# Patient Record
Sex: MALE | Race: WHITE | NOT HISPANIC OR LATINO | Employment: OTHER | URBAN - METROPOLITAN AREA
[De-identification: names, ages, dates, MRNs, and addresses within clinical notes are randomized per-mention and may not be internally consistent; named-entity substitution may affect disease eponyms.]

---

## 2023-12-19 ENCOUNTER — TELEPHONE (OUTPATIENT)
Dept: ORTHOPEDIC SURGERY | Facility: CLINIC | Age: 53
End: 2023-12-19

## 2023-12-19 ENCOUNTER — OFFICE VISIT (OUTPATIENT)
Dept: ORTHOPEDIC SURGERY | Facility: CLINIC | Age: 53
End: 2023-12-19
Payer: COMMERCIAL

## 2023-12-19 VITALS — HEIGHT: 72 IN | BODY MASS INDEX: 30.48 KG/M2 | WEIGHT: 225 LBS

## 2023-12-19 DIAGNOSIS — M19.039 WRIST ARTHRITIS: Primary | ICD-10-CM

## 2023-12-19 PROCEDURE — 1036F TOBACCO NON-USER: CPT | Performed by: ORTHOPAEDIC SURGERY

## 2023-12-19 PROCEDURE — 99214 OFFICE O/P EST MOD 30 MIN: CPT | Performed by: ORTHOPAEDIC SURGERY

## 2023-12-19 RX ORDER — NAPROXEN SODIUM 220 MG/1
1 TABLET, FILM COATED ORAL DAILY
COMMUNITY

## 2023-12-19 RX ORDER — METOPROLOL TARTRATE 25 MG/1
25 TABLET, FILM COATED ORAL 2 TIMES DAILY
COMMUNITY
Start: 2020-07-08

## 2023-12-19 RX ORDER — ATORVASTATIN CALCIUM 80 MG/1
1 TABLET, FILM COATED ORAL DAILY
COMMUNITY

## 2023-12-19 RX ORDER — TALC
3 POWDER (GRAM) TOPICAL
COMMUNITY

## 2023-12-19 RX ORDER — GLUCOSAMINE/CHONDR SU A SOD 750-600 MG
1 TABLET ORAL DAILY
COMMUNITY

## 2023-12-19 RX ORDER — NITROGLYCERIN 0.4 MG/1
0.4 TABLET SUBLINGUAL
COMMUNITY
Start: 2020-09-25

## 2023-12-19 RX ORDER — MULTIVITAMIN
1 TABLET ORAL
COMMUNITY

## 2023-12-19 ASSESSMENT — PAIN SCALES - GENERAL: PAINLEVEL_OUTOF10: 9

## 2023-12-19 ASSESSMENT — PAIN - FUNCTIONAL ASSESSMENT: PAIN_FUNCTIONAL_ASSESSMENT: 0-10

## 2023-12-19 ASSESSMENT — PAIN DESCRIPTION - DESCRIPTORS: DESCRIPTORS: SHARP

## 2023-12-19 NOTE — TELEPHONE ENCOUNTER
1/19/23 lt wrist 4 corner fusion and partial wrist denervation  Patient wants to reschedule his surgery date. This is not going to work. Curious if anything on 1/26/23.

## 2023-12-20 NOTE — PROGRESS NOTES
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  He has undergone 2 previous corticosteroid injections, the last in August 2023.  He had very minimal relief of the symptoms following the last corticosteroid injection.  He has remained very symptomatic with any activities.  His left wrist is severely limiting his regular function and he has had persistent stiffness as well as constant aching pain with sharp pains.  No numbness or tingling.    Past Medical History:   Diagnosis Date    Acute upper respiratory infection, unspecified 09/01/2022    URI, acute    Old myocardial infarction     History of myocardial infarction       Medication Documentation Review Audit       Reviewed by Leny Allen CMA (Medical Assistant) on 12/19/23 at 0832      Medication Order Taking? Sig Documenting Provider Last Dose Status   aspirin 81 mg chewable tablet [id number]  Chew 1 tablet (81 mg) once daily. Historical Provider, MD  Active   atorvastatin (Lipitor) 80 mg tablet [id number]  Take 1 tablet (80 mg) by mouth once daily. Historical Provider, MD  Active   glucosamine HCl 1,500 mg tablet [id number]  Take 1 tablet by mouth once daily. Historical Provider, MD  Active   melatonin 3 mg tablet [id number]  Take 1 tablet (3 mg) by mouth once daily. Historical Provider, MD  Active   metoprolol tartrate (Lopressor) 25 mg tablet [id number] Yes Take 1 tablet (25 mg) by mouth twice a day. Historical Provider, MD  Active   multivitamin tablet [id number]  Take 1 tablet by mouth once daily. Historical Provider, MD  Active   nitroglycerin (Nitrostat) 0.4 mg SL tablet [id number] Yes Place 1 tablet (0.4 mg) under the tongue. Historical Provider, MD  Active                    No Known Allergies    Social History     Socioeconomic History    Marital status:      Spouse name: Not on file    Number of children: Not on file     Years of education: Not on file    Highest education level: Not on file   Occupational History    Not on file   Tobacco Use    Smoking status: Never    Smokeless tobacco: Never   Substance and Sexual Activity    Alcohol use: Yes    Drug use: Never    Sexual activity: Defer   Other Topics Concern    Not on file   Social History Narrative    Not on file     Social Determinants of Health     Financial Resource Strain: Not on file   Food Insecurity: Not on file   Transportation Needs: Not on file   Physical Activity: Not on file   Stress: Not on file   Social Connections: Not on file   Intimate Partner Violence: Not on file   Housing Stability: Not on file       Past Surgical History:   Procedure Laterality Date    OTHER SURGICAL HISTORY  02/05/2018    Cath Stent Placement Type        Review of Systems   GENERAL: Negative for malaise, significant weight loss, fever  MUSCULOSKELETAL: see HPI  NEURO:  Negative     Physical Examination  Constitutional: Appears well-developed and well-nourished.  Head: Normocephalic and atraumatic.  Eyes: EOMI grossly  Cardiovascular: Intact distal pulses.   Respiratory: Effort normal. No respiratory distress.  Neurologic: Alert and oriented to person, place, and time.  Skin: Skin is warm and dry.  Hematologic / Lymphatic: No lymphedema, lymphangitis.  Psychiatric: normal mood and affect. Behavior is normal.   Musculoskeletal:  Left wrist: 40 degrees flexion and 35 degrees extension.  80/80 degrees pronation/supination.  Positive TRISTON.  Tenderness overlying radioscaphoid region.  No tenderness overlying first dorsal compartment or thumb CMC joint.  No tenderness about DRUJ/TFCC.  0 cm DPC.  2+ radial pulse.     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     Plan:  We once again discussed nature of the diagnosis as well as potential treatment options.  Patient is not responding to conservative treatment with  bracing and corticosteroid injection/anti-inflammatories.  We did discuss potential role of surgical intervention and 4 corner fusion versus proximal row carpectomy.  Patient does not smoke, but does occasionally use chewing tobacco.  After thoroughly reviewing associated risks and benefits patient would like to proceed with salvage procedure in the form of 4 corner fusion.  Advised that he should stop use of chewing tobacco in order to optimize potential for healing.  We did discuss permanent diminished motion, but this will hopefully be less painful.  Expected operative and postoperative course was reviewed and questions addressed.  Plan for 4 corner fusion with PIN neurectomy and likely partial radial styloidectomy.

## 2024-01-31 ENCOUNTER — ANESTHESIA EVENT (OUTPATIENT)
Dept: OPERATING ROOM | Facility: HOSPITAL | Age: 54
End: 2024-01-31
Payer: COMMERCIAL

## 2024-02-01 RX ORDER — DROPERIDOL 2.5 MG/ML
0.62 INJECTION, SOLUTION INTRAMUSCULAR; INTRAVENOUS ONCE AS NEEDED
Status: CANCELLED | OUTPATIENT
Start: 2024-02-01

## 2024-02-01 RX ORDER — SODIUM CHLORIDE, SODIUM LACTATE, POTASSIUM CHLORIDE, CALCIUM CHLORIDE 600; 310; 30; 20 MG/100ML; MG/100ML; MG/100ML; MG/100ML
100 INJECTION, SOLUTION INTRAVENOUS CONTINUOUS
Status: CANCELLED | OUTPATIENT
Start: 2024-02-01

## 2024-02-01 RX ORDER — OXYCODONE HYDROCHLORIDE 5 MG/1
5 TABLET ORAL EVERY 4 HOURS PRN
Status: CANCELLED | OUTPATIENT
Start: 2024-02-01

## 2024-02-01 RX ORDER — ONDANSETRON HYDROCHLORIDE 2 MG/ML
4 INJECTION, SOLUTION INTRAVENOUS ONCE AS NEEDED
Status: CANCELLED | OUTPATIENT
Start: 2024-02-01

## 2024-02-02 ENCOUNTER — APPOINTMENT (OUTPATIENT)
Dept: RADIOLOGY | Facility: HOSPITAL | Age: 54
End: 2024-02-02
Payer: COMMERCIAL

## 2024-02-02 ENCOUNTER — HOSPITAL ENCOUNTER (OUTPATIENT)
Facility: HOSPITAL | Age: 54
Setting detail: OUTPATIENT SURGERY
Discharge: HOME | End: 2024-02-02
Attending: ORTHOPAEDIC SURGERY | Admitting: ORTHOPAEDIC SURGERY
Payer: COMMERCIAL

## 2024-02-02 ENCOUNTER — ANESTHESIA (OUTPATIENT)
Dept: OPERATING ROOM | Facility: HOSPITAL | Age: 54
End: 2024-02-02
Payer: COMMERCIAL

## 2024-02-02 VITALS
HEART RATE: 81 BPM | DIASTOLIC BLOOD PRESSURE: 78 MMHG | BODY MASS INDEX: 31.95 KG/M2 | SYSTOLIC BLOOD PRESSURE: 129 MMHG | TEMPERATURE: 97.9 F | WEIGHT: 235.89 LBS | HEIGHT: 72 IN | OXYGEN SATURATION: 93 %

## 2024-02-02 DIAGNOSIS — M19.039 WRIST ARTHRITIS: Primary | ICD-10-CM

## 2024-02-02 PROCEDURE — 76942 ECHO GUIDE FOR BIOPSY: CPT | Performed by: ANESTHESIOLOGY

## 2024-02-02 PROCEDURE — 2500000001 HC RX 250 WO HCPCS SELF ADMINISTERED DRUGS (ALT 637 FOR MEDICARE OP): Performed by: ORTHOPAEDIC SURGERY

## 2024-02-02 PROCEDURE — 7100000009 HC PHASE TWO TIME - INITIAL BASE CHARGE: Performed by: ORTHOPAEDIC SURGERY

## 2024-02-02 PROCEDURE — 2500000004 HC RX 250 GENERAL PHARMACY W/ HCPCS (ALT 636 FOR OP/ED): Performed by: NURSE ANESTHETIST, CERTIFIED REGISTERED

## 2024-02-02 PROCEDURE — 64772 INCISION OF SPINAL NERVE: CPT | Performed by: ORTHOPAEDIC SURGERY

## 2024-02-02 PROCEDURE — 3600000008 HC OR TIME - EACH INCREMENTAL 1 MINUTE - PROCEDURE LEVEL THREE: Performed by: ORTHOPAEDIC SURGERY

## 2024-02-02 PROCEDURE — 3700000001 HC GENERAL ANESTHESIA TIME - INITIAL BASE CHARGE: Performed by: ORTHOPAEDIC SURGERY

## 2024-02-02 PROCEDURE — C1713 ANCHOR/SCREW BN/BN,TIS/BN: HCPCS | Performed by: ORTHOPAEDIC SURGERY

## 2024-02-02 PROCEDURE — A4217 STERILE WATER/SALINE, 500 ML: HCPCS | Performed by: ORTHOPAEDIC SURGERY

## 2024-02-02 PROCEDURE — 7100000010 HC PHASE TWO TIME - EACH INCREMENTAL 1 MINUTE: Performed by: ORTHOPAEDIC SURGERY

## 2024-02-02 PROCEDURE — A25825 PR FUSION/GRAFT INTERCARPAL: Performed by: NURSE ANESTHETIST, CERTIFIED REGISTERED

## 2024-02-02 PROCEDURE — 7100000002 HC RECOVERY ROOM TIME - EACH INCREMENTAL 1 MINUTE: Performed by: ORTHOPAEDIC SURGERY

## 2024-02-02 PROCEDURE — 2720000007 HC OR 272 NO HCPCS: Performed by: ORTHOPAEDIC SURGERY

## 2024-02-02 PROCEDURE — 3600000003 HC OR TIME - INITIAL BASE CHARGE - PROCEDURE LEVEL THREE: Performed by: ORTHOPAEDIC SURGERY

## 2024-02-02 PROCEDURE — 2500000004 HC RX 250 GENERAL PHARMACY W/ HCPCS (ALT 636 FOR OP/ED): Performed by: ORTHOPAEDIC SURGERY

## 2024-02-02 PROCEDURE — 25825 ARTHRD WRIST WITH AUTOGRAFT: CPT | Performed by: ORTHOPAEDIC SURGERY

## 2024-02-02 PROCEDURE — 2500000004 HC RX 250 GENERAL PHARMACY W/ HCPCS (ALT 636 FOR OP/ED): Performed by: ANESTHESIOLOGY

## 2024-02-02 PROCEDURE — 3700000002 HC GENERAL ANESTHESIA TIME - EACH INCREMENTAL 1 MINUTE: Performed by: ORTHOPAEDIC SURGERY

## 2024-02-02 PROCEDURE — 2780000003 HC OR 278 NO HCPCS: Performed by: ORTHOPAEDIC SURGERY

## 2024-02-02 PROCEDURE — 76000 FLUOROSCOPY <1 HR PHYS/QHP: CPT

## 2024-02-02 PROCEDURE — 2500000005 HC RX 250 GENERAL PHARMACY W/O HCPCS: Performed by: NURSE ANESTHETIST, CERTIFIED REGISTERED

## 2024-02-02 PROCEDURE — 7100000001 HC RECOVERY ROOM TIME - INITIAL BASE CHARGE: Performed by: ORTHOPAEDIC SURGERY

## 2024-02-02 DEVICE — IMPLANTABLE DEVICE: Type: IMPLANTABLE DEVICE | Site: WRIST | Status: FUNCTIONAL

## 2024-02-02 DEVICE — IMPLANTABLE DEVICE: Type: IMPLANTABLE DEVICE | Site: WRIST | Status: NON-FUNCTIONAL

## 2024-02-02 RX ORDER — SODIUM CHLORIDE, SODIUM LACTATE, POTASSIUM CHLORIDE, CALCIUM CHLORIDE 600; 310; 30; 20 MG/100ML; MG/100ML; MG/100ML; MG/100ML
100 INJECTION, SOLUTION INTRAVENOUS CONTINUOUS
Status: DISCONTINUED | OUTPATIENT
Start: 2024-02-02 | End: 2024-02-02 | Stop reason: HOSPADM

## 2024-02-02 RX ORDER — FENTANYL CITRATE 50 UG/ML
INJECTION, SOLUTION INTRAMUSCULAR; INTRAVENOUS AS NEEDED
Status: DISCONTINUED | OUTPATIENT
Start: 2024-02-02 | End: 2024-02-02

## 2024-02-02 RX ORDER — MIDAZOLAM HYDROCHLORIDE 1 MG/ML
INJECTION, SOLUTION INTRAMUSCULAR; INTRAVENOUS AS NEEDED
Status: DISCONTINUED | OUTPATIENT
Start: 2024-02-02 | End: 2024-02-02

## 2024-02-02 RX ORDER — PROPOFOL 10 MG/ML
INJECTION, EMULSION INTRAVENOUS AS NEEDED
Status: DISCONTINUED | OUTPATIENT
Start: 2024-02-02 | End: 2024-02-02

## 2024-02-02 RX ORDER — CHLORHEXIDINE GLUCONATE 40 MG/ML
SOLUTION TOPICAL AS NEEDED
Status: DISCONTINUED | OUTPATIENT
Start: 2024-02-02 | End: 2024-02-02 | Stop reason: HOSPADM

## 2024-02-02 RX ORDER — SODIUM CHLORIDE 0.9 G/100ML
IRRIGANT IRRIGATION AS NEEDED
Status: DISCONTINUED | OUTPATIENT
Start: 2024-02-02 | End: 2024-02-02 | Stop reason: HOSPADM

## 2024-02-02 RX ORDER — GLYCOPYRROLATE 0.2 MG/ML
INJECTION INTRAMUSCULAR; INTRAVENOUS AS NEEDED
Status: DISCONTINUED | OUTPATIENT
Start: 2024-02-02 | End: 2024-02-02

## 2024-02-02 RX ORDER — DEXAMETHASONE SODIUM PHOSPHATE 4 MG/ML
INJECTION, SOLUTION INTRA-ARTICULAR; INTRALESIONAL; INTRAMUSCULAR; INTRAVENOUS; SOFT TISSUE AS NEEDED
Status: DISCONTINUED | OUTPATIENT
Start: 2024-02-02 | End: 2024-02-02

## 2024-02-02 RX ORDER — PHENYLEPHRINE HCL IN 0.9% NACL 0.4MG/10ML
SYRINGE (ML) INTRAVENOUS AS NEEDED
Status: DISCONTINUED | OUTPATIENT
Start: 2024-02-02 | End: 2024-02-02

## 2024-02-02 RX ORDER — LIDOCAINE HYDROCHLORIDE 20 MG/ML
INJECTION, SOLUTION INFILTRATION; PERINEURAL AS NEEDED
Status: DISCONTINUED | OUTPATIENT
Start: 2024-02-02 | End: 2024-02-02

## 2024-02-02 RX ORDER — HYDROCODONE BITARTRATE AND ACETAMINOPHEN 5; 325 MG/1; MG/1
1 TABLET ORAL EVERY 6 HOURS PRN
Qty: 24 TABLET | Refills: 0 | Status: SHIPPED | OUTPATIENT
Start: 2024-02-02 | End: 2024-02-09

## 2024-02-02 RX ORDER — ONDANSETRON HYDROCHLORIDE 2 MG/ML
INJECTION, SOLUTION INTRAVENOUS AS NEEDED
Status: DISCONTINUED | OUTPATIENT
Start: 2024-02-02 | End: 2024-02-02

## 2024-02-02 RX ORDER — CEFAZOLIN SODIUM 2 G/100ML
2 INJECTION, SOLUTION INTRAVENOUS EVERY 8 HOURS
Status: DISCONTINUED | OUTPATIENT
Start: 2024-02-02 | End: 2024-02-02 | Stop reason: HOSPADM

## 2024-02-02 RX ORDER — CEFAZOLIN 1 G/1
INJECTION, POWDER, FOR SOLUTION INTRAVENOUS AS NEEDED
Status: DISCONTINUED | OUTPATIENT
Start: 2024-02-02 | End: 2024-02-02

## 2024-02-02 RX ADMIN — DEXAMETHASONE SODIUM PHOSPHATE 8 MG: 4 INJECTION INTRA-ARTICULAR; INTRALESIONAL; INTRAMUSCULAR; INTRAVENOUS; SOFT TISSUE at 14:59

## 2024-02-02 RX ADMIN — FENTANYL CITRATE 100 MCG: 50 INJECTION, SOLUTION INTRAMUSCULAR; INTRAVENOUS at 14:50

## 2024-02-02 RX ADMIN — CEFAZOLIN 2 G: 1 INJECTION, POWDER, FOR SOLUTION INTRAMUSCULAR; INTRAVENOUS at 14:44

## 2024-02-02 RX ADMIN — PROPOFOL 200 MG: 10 INJECTION, EMULSION INTRAVENOUS at 14:50

## 2024-02-02 RX ADMIN — SODIUM CHLORIDE, SODIUM LACTATE, POTASSIUM CHLORIDE, AND CALCIUM CHLORIDE: 600; 310; 30; 20 INJECTION, SOLUTION INTRAVENOUS at 15:20

## 2024-02-02 RX ADMIN — GLYCOPYRROLATE 0.2 MG: 0.2 INJECTION, SOLUTION INTRAMUSCULAR; INTRAVENOUS at 14:59

## 2024-02-02 RX ADMIN — Medication 80 MCG: at 15:43

## 2024-02-02 RX ADMIN — LIDOCAINE HYDROCHLORIDE 50 MG: 20 INJECTION, SOLUTION INFILTRATION; PERINEURAL at 14:50

## 2024-02-02 RX ADMIN — FENTANYL CITRATE 100 MCG: 50 INJECTION, SOLUTION INTRAMUSCULAR; INTRAVENOUS at 13:55

## 2024-02-02 RX ADMIN — SODIUM CHLORIDE, SODIUM LACTATE, POTASSIUM CHLORIDE, AND CALCIUM CHLORIDE: 600; 310; 30; 20 INJECTION, SOLUTION INTRAVENOUS at 14:38

## 2024-02-02 RX ADMIN — SODIUM CHLORIDE, POTASSIUM CHLORIDE, SODIUM LACTATE AND CALCIUM CHLORIDE 100 ML/HR: 600; 310; 30; 20 INJECTION, SOLUTION INTRAVENOUS at 13:34

## 2024-02-02 RX ADMIN — GLYCOPYRROLATE 0.2 MG: 0.2 INJECTION, SOLUTION INTRAMUSCULAR; INTRAVENOUS at 15:19

## 2024-02-02 RX ADMIN — ONDANSETRON 4 MG: 2 INJECTION, SOLUTION INTRAMUSCULAR; INTRAVENOUS at 16:44

## 2024-02-02 RX ADMIN — MIDAZOLAM 2 MG: 1 INJECTION INTRAMUSCULAR; INTRAVENOUS at 13:55

## 2024-02-02 RX ADMIN — Medication 80 MCG: at 15:33

## 2024-02-02 RX ADMIN — Medication 80 MCG: at 15:19

## 2024-02-02 SDOH — HEALTH STABILITY: MENTAL HEALTH: CURRENT SMOKER: 0

## 2024-02-02 ASSESSMENT — PAIN SCALES - GENERAL
PAINLEVEL_OUTOF10: 0 - NO PAIN
PAIN_LEVEL: 0

## 2024-02-02 ASSESSMENT — PAIN - FUNCTIONAL ASSESSMENT
PAIN_FUNCTIONAL_ASSESSMENT: 0-10

## 2024-02-02 NOTE — H&P
History of Present Illness:       Chief Complaint   Patient presents with    Left Wrist - Follow-up       SLAC wrist       Patient has failed conservative treatment for left SLAC wrist.  Presents today for left 4 corner fusion.     Medical History        Past Medical History:   Diagnosis Date    Acute upper respiratory infection, unspecified 09/01/2022     URI, acute    Old myocardial infarction       History of myocardial infarction            Medication Documentation Review Audit         Reviewed by Leny Allen CMA (Medical Assistant) on 12/19/23 at 0832       Medication Order Taking? Sig Documenting Provider Last Dose Status   aspirin 81 mg chewable tablet [id number]   Chew 1 tablet (81 mg) once daily. Historical Provider, MD   Active   atorvastatin (Lipitor) 80 mg tablet [id number]   Take 1 tablet (80 mg) by mouth once daily. Historical Provider, MD   Active   glucosamine HCl 1,500 mg tablet [id number]   Take 1 tablet by mouth once daily. Historical Provider, MD   Active   melatonin 3 mg tablet [id number]   Take 1 tablet (3 mg) by mouth once daily. Historical Provider, MD   Active   metoprolol tartrate (Lopressor) 25 mg tablet [id number] Yes Take 1 tablet (25 mg) by mouth twice a day. Historical Provider, MD   Active   multivitamin tablet [id number]   Take 1 tablet by mouth once daily. Historical Provider, MD   Active   nitroglycerin (Nitrostat) 0.4 mg SL tablet [id number] Yes Place 1 tablet (0.4 mg) under the tongue. Historical Provider, MD   Active                          No Known Allergies     Social History               Socioeconomic History    Marital status:        Spouse name: Not on file    Number of children: Not on file    Years of education: Not on file    Highest education level: Not on file   Occupational History    Not on file   Tobacco Use    Smoking status: Never    Smokeless tobacco: Never   Substance and Sexual Activity    Alcohol use: Yes    Drug use: Never    Sexual activity: Defer    Other Topics Concern    Not on file   Social History Narrative    Not on file      Social Determinants of Health      Financial Resource Strain: Not on file   Food Insecurity: Not on file   Transportation Needs: Not on file   Physical Activity: Not on file   Stress: Not on file   Social Connections: Not on file   Intimate Partner Violence: Not on file   Housing Stability: Not on file            Surgical History         Past Surgical History:   Procedure Laterality Date    OTHER SURGICAL HISTORY   02/05/2018     Cath Stent Placement Type            Review of Systems   GENERAL: Negative for malaise, significant weight loss, fever  MUSCULOSKELETAL: see HPI  NEURO:  Negative     Physical Examination  Constitutional: Appears well-developed and well-nourished.  Head: Normocephalic and atraumatic.  Eyes: EOMI grossly  Cardiovascular: Intact distal pulses.   Respiratory: Effort normal. No respiratory distress.  Neurologic: Alert and oriented to person, place, and time.  Skin: Skin is warm and dry.  Hematologic / Lymphatic: No lymphedema, lymphangitis.  Psychiatric: normal mood and affect. Behavior is normal.   Musculoskeletal:  Left wrist: 40 degrees flexion and 35 degrees extension.  80/80 degrees pronation/supination.  Positive TRISTON.  Tenderness overlying radioscaphoid region.  No tenderness overlying first dorsal compartment or thumb CMC joint.  No tenderness about DRUJ/TFCC.  0 cm DPC.  2+ radial pulse.     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     Plan:  We once again discussed nature of the diagnosis as well as potential treatment options.  Patient is not responding to conservative treatment with bracing and corticosteroid injection/anti-inflammatories.  We did discuss potential role of surgical intervention and 4 corner fusion versus proximal row carpectomy.  Patient does not smoke, but does occasionally use chewing tobacco.   After thoroughly reviewing associated risks and benefits patient would like to proceed with salvage procedure in the form of 4 corner fusion.  Advised that he should stop use of chewing tobacco in order to optimize potential for healing.  We did discuss permanent diminished motion, but this will hopefully be less painful.  Expected operative and postoperative course was reviewed and questions addressed.  Plan for 4 corner fusion with PIN neurectomy and likely partial radial styloidectomy.

## 2024-02-02 NOTE — ANESTHESIA PREPROCEDURE EVALUATION
Patient: Eddie Young    Procedure Information       Date/Time: 02/02/24 1415    Procedure: LEFT WRIST 4-CORNER FUSION AND PARTIAL WRIST DENERVATION (Left: Wrist)    Location: [facility] / [facility]    Surgeons: Carroll Saba MD          There were no vitals filed for this visit.    Past Surgical History:   Procedure Laterality Date    APPENDECTOMY      CORONARY ANGIOPLASTY WITH STENT PLACEMENT  2018    Drug-eluting stent was placedin the circumflex marginal branch    HERNIA REPAIR       Past Medical History:   Diagnosis Date    Acute upper respiratory infection, unspecified 09/01/2022    URI, acute    CAD (coronary artery disease), native coronary artery     Class 1 obesity with body mass index (BMI) of 30.0 to 30.9 in adult 12/19/2023    30.52 kg/m²    HLD (hyperlipidemia)     Old myocardial infarction 2018    History of myocardial infarction    Wrist arthritis      No current facility-administered medications for this encounter.  Prior to Admission medications    Medication Sig Start Date End Date Taking? Authorizing Provider   aspirin 81 mg chewable tablet Chew 1 tablet (81 mg) once daily.    Historical Provider, MD   atorvastatin (Lipitor) 80 mg tablet Take 1 tablet (80 mg) by mouth once daily.    Historical Provider, MD   glucosamine HCl 1,500 mg tablet Take 1 tablet by mouth once daily.    Historical Provider, MD   melatonin 3 mg tablet Take 1 tablet (3 mg) by mouth once daily.    Historical Provider, MD   metoprolol tartrate (Lopressor) 25 mg tablet Take 1 tablet (25 mg) by mouth twice a day. 7/8/20   Historical Provider, MD   multivitamin tablet Take 1 tablet by mouth once daily.    Historical Provider, MD   nitroglycerin (Nitrostat) 0.4 mg SL tablet Place 1 tablet (0.4 mg) under the tongue. 9/25/20   Historical Provider, MD     No Known Allergies  Social History     Tobacco Use    Smoking status: Never    Smokeless tobacco: Never   Substance Use Topics    Alcohol use: Yes         Chemistry    Lab  "Results   Component Value Date/Time     08/08/2022 1003    K 4.6 08/08/2022 1003     08/08/2022 1003    CO2 27 08/08/2022 1003    BUN 17 08/08/2022 1003    CREATININE 0.90 08/08/2022 1003    Lab Results   Component Value Date/Time    CALCIUM 9.2 08/08/2022 1003    ALKPHOS 73 08/08/2022 1003    AST 27 08/08/2022 1003    ALT 40 08/08/2022 1003    BILITOT 0.8 08/08/2022 1003          Lab Results   Component Value Date/Time    WBC 6.4 08/08/2022 1003    HGB 16.1 08/08/2022 1003    HCT 46.4 08/08/2022 1003     08/08/2022 1003     No results found for: \"PROTIME\", \"PTT\", \"INR\"  No results found for this or any previous visit (from the past 4464 hour(s)).  No results found for this or any previous visit from the past 1095 days.    Relevant Problems   Other   (+) Wrist arthritis       Clinical information reviewed:       Med Hx             NPO Detail:  No data recorded     Physical Exam    Airway  Mallampati: II     Cardiovascular - normal exam     Dental    Pulmonary    Abdominal            Anesthesia Plan    History of general anesthesia?: yes  History of complications of general anesthesia?: no    ASA 2     general and regional     The patient is not a current smoker.  Patient was not previously instructed to abstain from smoking on day of procedure.  Patient did not smoke on day of procedure.  Education provided regarding risk of obstructive sleep apnea.  intravenous induction   Anesthetic plan and risks discussed with patient.    Plan discussed with CRNA.      "

## 2024-02-02 NOTE — DISCHARGE INSTRUCTIONS
Dr. Saba Post-Operative Instructions  Hand/Wrist/Elbow    Activity:  Rest on the day of surgery.  Gradually resume your regular diet, beginning with clear liquids and progressing as you feel ready.  No driving if you had anesthesia.    Anesthesia:  You may feel dizzy, sleepy or lightheaded for up to 24 hours after surgery.  If you had general anesthesia you may have a sore throat for 1-2 days.  If you had a nerve block wear a sling until nerve function returns for your safety.  Nerve block may last 18-36 hours.    Post-Operative Medications:  You may resume your regular medications (including blood thinners if you stopped them).  You have been given a prescription for pain medication as needed.  You may also take over-the-counter anti-inflammatories and/or Tylenol for pain relief.  If you are taking the prescribed pain medication you must limit additional Tylenol (acetaminophen) intake to avoid overdose.    Dressings:  Keep your splint clean and dry.  You may cover with a plastic bag or cast cover and seal bag to your skin above the bandage for showering.  If your dressing becomes wet or significantly bloodstained call the office at [phone].    Post-Operative Care:  Keep the surgical site elevated above the level of your heart to limit swelling.  If your fingers are not included in the dressing you are encouraged to move your fingers regularly (full fist and full extension).  Regularly ice the surgical site.  You may also apply ice pack above the level of the dressing and this will cool the blood as it travels towards the surgical site.    Call Surgeon/Office at any time for:           Office Number: [phone]  Excessive bleeding  Loss of feeling (The local numbing medicine from surgery typically lasts 8-12 hours.  Nerve blocks can last 18-36 hours.)  Tight dressing: Make sure that you are elevating the operative site appropriately.  If no relief then call the office.                                                                                                         Circulation issues:  If fingers change to white or blue  Concerns/Problems with your surgery

## 2024-02-02 NOTE — OP NOTE
Pre-Operative Diagnosis: Left SLAC wrist  Post-Operative Diagnosis: same  Procedure: Left wrist 4 corner fusion with partial wrist denervation  Surgeon: Wandy  Assistant: Shirlene  Anesthesia: General with regional block  Complications: none  Estimated blood loss: 25 mL  Specimen: None  Implants:  Implant Name Type Inv. Item Serial No.  Lot No. LRB No. Used Action   HEADLESS COMPRESSION SCREW 3.5mm X 26mm    [employer]  Left 1 Implanted   SCREW, HEADLESS COMPRESSION, 2.5 X 22MM, TI - [id number] Screw SCREW, HEADLESS COMPRESSION, 2.5 X 22MM, TI  [employer]  Left 1 Implanted     Findings: See below  Disposition: Good/PACU      Indications: Patient with left SLAC wrist that is failed conservative treatment.  We discussed risks and benefits of various treatment options including various nonoperative and operative treatment modalities.  After thoroughly reviewing patient would like to proceed with 4 corner fusion and plan for concurrent partial wrist denervation.  Expected operative and postoperative courses reviewed and questions addressed.    Operative course: Patient was greeted in the preoperative holding area and the operative site was marked with indelible marker.  Regional block was performed by the anesthesia team.  Patient was brought back to the operating room suite where left upper extremity was prepped and draped in standard sterile fashion timeout procedure was performed as per standard protocol.  Esmarch was used to exsanguinate left upper extremity and upper arm tourniquet was inflated.  Longitudinal incision was made in line with Beryl's tubercle, extending distally.  Gentle spreading was carried down through the subcutaneous tissues.  The superficial veins were retracted radially and ulnarly and the EPL tendon was identified.  The EPL extensor retinaculum was partially released so that the EPL could be temporarily transposed and retracted radially.  The fourth dorsal  compartment was then partially elevated to allow for visualization of the PIN nerve on the floor the fourth dorsal compartment.  A 1 cm portion of the PIN nerve was then sharply excised.  The dorsal wrist capsule was then opened, leaving a proximal flap.  This allowed for visualization of the carpal bones.  The scaphoid was found to have complete denuding of cartilage along the proximal aspect with severe arthritis about the radial styloid.  The scaphoid was then excised with assistance of a rongeur.  The internal bone of the scaphoid was harvested for use as autograft.  Joint preparation was then undertaken for the distal portion of the lunate, proximal portion of the capitate as well as the surfaces of the hamate and triquetrum.  Fluoroscopic imaging was then utilized and the assistance of reduction of the dorsal lunate tilt as well as the radial shift of the distal carpal row.  After reasonable alignment was achieved K wires were positioned appropriately with use of fluoroscopic imaging to prepare for insertion of headless compression screws.  The screw from the lunate into the capitate was placed in a antegrade fashion.  This was performed after some of the bone graft was placed about the volar fusion site.  Excellent compression was achieved.  A second compression screw was placed from the triquetrum into the capitate.  Additional compression was appreciated.  The remaining bone graft was packed dorsally.  Tourniquet was released and small venous bleeders were cauterized with bipolar electrocautery.  Wound was irrigated and the dorsal capsule was repaired with 2-0 Vicryl.  The retinaculum overlying the EPL tendon was also repaired with 2-0 Vicryl and tendon was confirmed to have good gliding after repair of this retinaculum.    Skin was reapproximated with 4-0 Monocryl in a buried interrupted fashion followed by 4-0 V-Loc suture.  Exofin mesh dressings were applied and patient was transitioned into a volar  splint.    Patient was awoken from anesthesia uneventfully and taken the recovery for further care.  He will follow-up in 2 weeks with new radiographs out of splint.  Tentative plan for transition to brace versus cast until radiographic healing.  He will remain strictly nonweightbearing to his left hand/wrist.

## 2024-02-02 NOTE — ANESTHESIA PROCEDURE NOTES
Peripheral Block    Patient location during procedure: pre-op  Reason for block: at surgeon's request and post-op pain management  Staffing  Performed: attending   Authorized by: Juan Manuel Cardenas MD    Performed by: Juan Manuel Cardenas MD  Preanesthetic Checklist  Completed: patient identified, IV checked, site marked, risks and benefits discussed, surgical consent, monitors and equipment checked, pre-op evaluation and timeout performed   Timeout performed at:   Peripheral Block  Patient position: laying flat  Prep: ChloraPrep and site prepped and draped  Patient monitoring: heart rate and continuous pulse ox  Block type: interscalene, infraclavicular and supraclavicular  Laterality: left  Injection technique: single-shot  Guidance: ultrasound guided  Needle  Needle type: short-bevel   Needle gauge: 22 G  Needle length: 5 cm  Needle localization: anatomical landmarks, ultrasound guidance and nerve stimulator  Assessment  Injection assessment: negative aspiration for heme, no paresthesia on injection, incremental injection and local visualized surrounding nerve on ultrasound  Paresthesia pain: none  Heart rate change: no  Slow fractionated injection: yes  Additional Notes  30mL 0.5% marcaine 1:200k epi 5mg decadron

## 2024-02-02 NOTE — ANESTHESIA POSTPROCEDURE EVALUATION
Patient: Eddie Young    Procedure Summary       Date: 02/02/24 Room / Location: [facility] / [facility]    Anesthesia Start: 1438 Anesthesia Stop: 1758    Procedure: LEFT WRIST 4-CORNER FUSION AND PARTIAL WRIST DENERVATION (Left: Wrist) Diagnosis:       Wrist arthritis      (Wrist arthritis [M19.039])    Surgeons: Carroll Saba MD Responsible Provider: RAJEEV Bellamy    Anesthesia Type: general, regional ASA Status: 2            Anesthesia Type: general, regional    Vitals Value Taken Time   /89 02/02/24 1754   Temp 36.6 °C (97.9 °F) 02/02/24 1754   Pulse 89 02/02/24 1805   Resp 16 02/02/24 1808   SpO2 94 % 02/02/24 1805   Vitals shown include unvalidated device data.    Anesthesia Post Evaluation    Patient location during evaluation: PACU  Patient participation: complete - patient participated  Level of consciousness: awake and alert  Pain score: 0  Pain management: adequate  Multimodal analgesia pain management approach  Airway patency: patent  Cardiovascular status: acceptable  Respiratory status: acceptable  Hydration status: acceptable  Postoperative Nausea and Vomiting: none        No notable events documented.

## 2024-02-02 NOTE — ANESTHESIA PROCEDURE NOTES
Airway  Date/Time: 2/2/2024 2:50 PM  Urgency: elective      Staffing  Performed: CRNA   Authorized by: RAJEEV Bellamy    Performed by: RAJEEV Olivarez  Patient location during procedure: OR    Indications and Patient Condition  Indications for airway management: anesthesia  Spontaneous Ventilation: absent  Sedation level: deep  Preoxygenated: yes  Patient position: sniffing  Mask difficulty assessment: 0 - not attempted    Final Airway Details  Final airway type: supraglottic airway      Successful airway: Supraglottic airway: IGEL.  Size 5     Number of attempts at approach: 1

## 2024-02-13 ENCOUNTER — HOSPITAL ENCOUNTER (OUTPATIENT)
Dept: RADIOLOGY | Facility: CLINIC | Age: 54
Discharge: HOME | End: 2024-02-13
Payer: COMMERCIAL

## 2024-02-13 ENCOUNTER — OFFICE VISIT (OUTPATIENT)
Dept: ORTHOPEDIC SURGERY | Facility: CLINIC | Age: 54
End: 2024-02-13
Payer: COMMERCIAL

## 2024-02-13 DIAGNOSIS — M19.039 WRIST ARTHRITIS: ICD-10-CM

## 2024-02-13 PROCEDURE — 1036F TOBACCO NON-USER: CPT | Performed by: ORTHOPAEDIC SURGERY

## 2024-02-13 PROCEDURE — L3908 WHO COCK-UP NONMOLDE PRE OTS: HCPCS | Performed by: ORTHOPAEDIC SURGERY

## 2024-02-13 PROCEDURE — 73110 X-RAY EXAM OF WRIST: CPT | Mod: LEFT SIDE | Performed by: RADIOLOGY

## 2024-02-13 PROCEDURE — 73110 X-RAY EXAM OF WRIST: CPT | Mod: LT

## 2024-02-13 PROCEDURE — 99024 POSTOP FOLLOW-UP VISIT: CPT | Performed by: ORTHOPAEDIC SURGERY

## 2024-02-13 ASSESSMENT — PAIN - FUNCTIONAL ASSESSMENT: PAIN_FUNCTIONAL_ASSESSMENT: NO/DENIES PAIN

## 2024-02-13 NOTE — PROGRESS NOTES
History of Present Illness  Chief Complaint   Patient presents with    Left Wrist - Post-op     2/2/24 left wrist 4 corner fusion     No numbness or tingling.  Pain controlled.     Examination  left wrist  Incisions are well healing.  No signs of infection.  Wrist motion not assessed.  0 cm DPC  EPL and FPL intact.  Sensation grossly intact to light touch throughout hand, specifically including radial and ulnar sensory  Capillary refill less than 2 seconds to all digits.     Left wrist radiographs ordered and available for review demonstrate maintained alignment status post 4 corner fusion.  Hardware in appropriate positioning.    Assessment:  Patient status post left wrist 4 corner fusion  Patient transitioned to removable brace that he will wear full-time.  Remain less than 1 to 2 pounds weightbearing.  Follow-up in 4 weeks with new wrist radiographs.  If there is evidence of fusion and healing we will likely refer to therapy for gradual mobilization at that time.

## 2024-03-12 ENCOUNTER — HOSPITAL ENCOUNTER (OUTPATIENT)
Dept: RADIOLOGY | Facility: CLINIC | Age: 54
Discharge: HOME | End: 2024-03-12
Payer: COMMERCIAL

## 2024-03-12 ENCOUNTER — OFFICE VISIT (OUTPATIENT)
Dept: ORTHOPEDIC SURGERY | Facility: CLINIC | Age: 54
End: 2024-03-12
Payer: COMMERCIAL

## 2024-03-12 DIAGNOSIS — M19.039 WRIST ARTHRITIS: ICD-10-CM

## 2024-03-12 PROCEDURE — 73110 X-RAY EXAM OF WRIST: CPT | Mod: LEFT SIDE | Performed by: STUDENT IN AN ORGANIZED HEALTH CARE EDUCATION/TRAINING PROGRAM

## 2024-03-12 PROCEDURE — 1036F TOBACCO NON-USER: CPT | Performed by: ORTHOPAEDIC SURGERY

## 2024-03-12 PROCEDURE — 99024 POSTOP FOLLOW-UP VISIT: CPT | Performed by: ORTHOPAEDIC SURGERY

## 2024-03-12 PROCEDURE — 73110 X-RAY EXAM OF WRIST: CPT | Mod: LT

## 2024-03-12 ASSESSMENT — PAIN - FUNCTIONAL ASSESSMENT: PAIN_FUNCTIONAL_ASSESSMENT: NO/DENIES PAIN

## 2024-03-12 NOTE — PROGRESS NOTES
History of Present Illness  Chief Complaint   Patient presents with    Left Wrist - Post-op     2/2/24 left wrist four corner fusion     No numbness or tingling.  Pain continues to improve     Examination  left wrist  Incisions are well healing.  No signs of infection.  30 degrees wrist flexion and 10 degrees extension immediately brace  0 cm DPC  EPL and FPL intact.  Sensation grossly intact to light touch throughout hand, specifically including radial and ulnar sensory.  Patient does note some slightly diminished sensation in small area immediately distal to incision  Capillary refill less than 2 seconds to all digits.  Minimal tenderness overlying fusion site     Left wrist radiographs ordered and available for review demonstrate maintained alignment status post 4 corner fusion.  Hardware in appropriate positioning.  Some consolidation fusion site    Assessment:  Patient status post left wrist 4 corner fusion  Patient may gradually wean from his wrist brace over the next few weeks.  No weightbearing 1 to 2 pounds referral to therapy has been placed for assistance with active mobilization.  He will follow-up in 4 weeks with repeat right wrist radiographs and likely advance activities further at that time.

## 2024-03-25 ENCOUNTER — EVALUATION (OUTPATIENT)
Dept: OCCUPATIONAL THERAPY | Facility: CLINIC | Age: 54
End: 2024-03-25
Payer: COMMERCIAL

## 2024-03-25 DIAGNOSIS — M19.039 WRIST ARTHRITIS: ICD-10-CM

## 2024-03-25 PROCEDURE — 97165 OT EVAL LOW COMPLEX 30 MIN: CPT | Mod: GO

## 2024-03-25 ASSESSMENT — ENCOUNTER SYMPTOMS
DEPRESSION: 0
LOSS OF SENSATION IN FEET: 0
OCCASIONAL FEELINGS OF UNSTEADINESS: 0

## 2024-03-25 NOTE — PROGRESS NOTES
"  Occupational Therapy Orthopedic Evaluation    Patient Name: Eddie Young  MRN: [id number]  Today's Date: 3/25/2024     Insurance:  Visit number: 1  Insurance Type: Ambetter   Authorization info: Requesting auth    General:  Reason for visit: Left wrist arthritis M19.039  Referred by: Dr. STACIE Saba MD     Current Problem  1. Wrist arthritis  Referral to Occupational Therapy        Precautions:  1-2 lbs lifting restriction, per physician note    Medical History Form: Reviewed (scanned into chart)  Diagnoses pertinent to therapy: See AEMR     SUBJECTIVE:   Patient is a 53 yr old male referred to Occupational Therapy for the diagnosis of Left wrist arthritis s/p 4 corner fusion surgery. Pt presents with soft prefab wrist brace he purchased, and wears physician issued split at night.     JORGE: Pt on side by side 4 elder and hip stump, injured hand  Date of onset: Per pt, approx 3 yrs ago  Date of surgery: February 2, 2024   Chief complaints/concerns from patient/family member: Tightness in fingers with fisting, Pain and difficulty holding onto household objects/IADSL. Increased wrist swelling and pain.     Hand Dominance: Right    Pain:   Location: Left dorsal wrist   At rest :  0/10            Fxal use/movement:   6/10      Worst:  6/10  Description/Type: \"Pressure\" Ache  Aggravating Factors: Fine motor with digits, wrist AROM  Relieving Factors: CP use    Relevant Information (PMH & Previous Tests/Imaging):   Previous Interventions/Treatments: None    Prior Level of Function (PLOF)  Previous ADL/IADL Status:  Independent   Work/School: Retired    Leisure/Hobbies: Fishing, hunting     Patients Living Environment: Spouse     Primary Language: English    Pt goals for therapy:   Decrease pain, increase use for daily activities.     OBJECTIVE:     ROM:  Elbow/Forearm AROM (Degrees of motion)    R WNL/WFL throughout  L   Extension  WNL   Flexion  WNL   Pronation  WFL   Supination  WFL      Wrist AROM  (Degrees of " motion)    R L   Extension  33   Flexion  42   Radial Deviation  5   Ulnar Deviation  13      Composite fist/digit AROM: Tight fist with stiffness reports in digits   Thumb AROM: WNL     Hand Strength Measures: LBS   R L   Dynamometer  NE NE due to surgical healing/protocol   Lateral Pinch NE    3jaw Pinch  Tip Pinch NE       Physical Observation:   Edema: Mild edema observed dorsal wrist   Paresthesias: Over incision. Light touch grossly intact  Scar/Incision: Dorsal wrist incision thin, gliding well, no hypersensitivity reports  Coordination: WFL    Quick Dash outcome measurement: 47.73%  %    Red Flags: Do you have any of the following? No  Fever/chills, unexplained weight changes, dizziness/fainting, unexplained change in bowel or bladder functions, unexplained malaise or muscle weakness, night pain/sweats, numbness or tingling    Treatment:   OT evaluation completed and HEP issued.   Patient fitted and issued a tubigrip E sleeve x 2 for edema reduction/support with splint weaning. Wear, care and precautions instructed to patient. He reports good fit and understanding of precautions.    Patient education on rationale, benefits and timing of MH, warm soaks and CP use to decrease pain/symptoms.  Gentle AROM of F/A, wrist TGE's, thumb exercises.  Patient verbalizes and demonstrates good understanding,technique and precautions with above.  Written and illustrated handouts issued to patient.        ASSESSMENT:   Patient is a 53 y.o. male  with the diagnosis of Left wrist arthritis s/p 4 corner fusion resulting in limited ability and participation in ADLs, IADLS and leisure activities.. Pt demonstrating increase pain and swelling of wrist, decreased fxal mobility and strength. Pt would benefit from skilled Occupational Therapy to address the above deficits in order to return to functional activities as able with increased independence.     PLAN:  Goals:    Active       OT Goals       Pt to improve left wrist  active flexion/extension by > 10 degrees or better for increase ease with ADLS.       Start:  03/25/24    Expected End:  05/24/24            Patient to increase  strength to WFL of Left hand for ease with lifting and carrying tasks and IADLS.        Start:  03/25/24    Expected End:  05/24/24            Patient to improve m.strength of left forearm/wrist to 4/5 or better for return to leisure activities (ie. fishing/hunting.).       Start:  03/25/24    Expected End:  05/24/24               OT Problem       PATIENT WILL be independent with pain reduction techs OF 2/10 left wrist DEMONSTRATING A REDUCTION OF OVERALL PAIN       Start:  03/25/24    Expected End:  05/24/24            PATIENT WILL DEMONSTRATE INDEPENDENCE IN HOME PROGRAM FOR SUPPORT OF PROGRESSION       Start:  03/25/24    Expected End:  05/24/24               Planned Interventions include: therapeutic exercise, therapeutic activity, self-care home management, manual therapy, neuromuscular education , electric stimulation, fluidotherapy, ultrasound, Home exercise program, orthosis fabrication, wound care/scar management.     Frequency: 1-2 x week  Duration: 4-6   weeks    Rehab Potential: Good  Plan of care was developed with input and agreement by the patient.       Mita Barnes MS, OTR/L [id number]

## 2024-04-09 ENCOUNTER — OFFICE VISIT (OUTPATIENT)
Dept: ORTHOPEDIC SURGERY | Facility: CLINIC | Age: 54
End: 2024-04-09
Payer: COMMERCIAL

## 2024-04-09 ENCOUNTER — HOSPITAL ENCOUNTER (OUTPATIENT)
Dept: RADIOLOGY | Facility: CLINIC | Age: 54
Discharge: HOME | End: 2024-04-09
Payer: COMMERCIAL

## 2024-04-09 DIAGNOSIS — M19.039 WRIST ARTHRITIS: ICD-10-CM

## 2024-04-09 PROCEDURE — 99024 POSTOP FOLLOW-UP VISIT: CPT | Performed by: ORTHOPAEDIC SURGERY

## 2024-04-09 PROCEDURE — 73110 X-RAY EXAM OF WRIST: CPT | Mod: LT

## 2024-04-09 PROCEDURE — 73110 X-RAY EXAM OF WRIST: CPT | Mod: LEFT SIDE | Performed by: STUDENT IN AN ORGANIZED HEALTH CARE EDUCATION/TRAINING PROGRAM

## 2024-04-09 PROCEDURE — 1036F TOBACCO NON-USER: CPT | Performed by: ORTHOPAEDIC SURGERY

## 2024-04-09 ASSESSMENT — PAIN - FUNCTIONAL ASSESSMENT: PAIN_FUNCTIONAL_ASSESSMENT: NO/DENIES PAIN

## 2024-04-09 NOTE — PROGRESS NOTES
History of Present Illness  Chief Complaint   Patient presents with    Left Wrist - Post-op     2/2/24 left wrist four corner fusion      Pain has continued to improve.  Some continued wrist stiffness.      Examination  left wrist  Incisions are well healed.  No signs of infection.  40 degrees wrist flexion and 25 degrees extension  0 cm DPC  EPL and FPL intact.  Sensation intact distally.  Capillary refill less than 2 seconds to all digits.  No tenderness overlying fusion site     Left wrist radiographs ordered and available for review demonstrate maintained alignment status post 4 corner fusion.  Evidence of further healing, particularly about the lunocapitate fusion site    Assessment:  Patient status post left wrist 4 corner fusion  Continue gradual progression of activities.  May begin with strengthening.  Recommend avoiding contact activities for another 4 to 6 weeks.  He will follow-up with me in 6 weeks if any persistent issues or concerns.  Questions addressed.

## 2024-04-18 ENCOUNTER — TREATMENT (OUTPATIENT)
Dept: OCCUPATIONAL THERAPY | Facility: CLINIC | Age: 54
End: 2024-04-18
Payer: COMMERCIAL

## 2024-04-18 DIAGNOSIS — M19.039 WRIST ARTHRITIS: ICD-10-CM

## 2024-04-18 PROCEDURE — 97110 THERAPEUTIC EXERCISES: CPT | Mod: GO

## 2024-04-18 PROCEDURE — 97140 MANUAL THERAPY 1/> REGIONS: CPT | Mod: GO

## 2024-04-18 PROCEDURE — 97022 WHIRLPOOL THERAPY: CPT | Mod: GO

## 2024-04-18 NOTE — PROGRESS NOTES
"Occupational Therapy   Occupational Therapy Treatment    Patient Name: Eddie Young  MRN: [id number]  Today's Date: 4/18/2024     Insurance:  Visit number: 2 (1 of 6)  Insurance Type: Ambetter   Authorization info: Refer to insurance section in chart    Current Problem  1. Wrist arthritis  Follow Up In Occupational Therapy        Precautions     SUBJECTIVE:   \"The doctor says it is healing well.\" Pt reports of continued soreness and stiffness in left wrist.    Pain:   No significant pain reports  Location:   Description:     Performing HEP?: Yes    OBJECTIVE:   HAND STRENGTH (Lbs) Setting III:    R L   Dynamometer  100 37   Lateral Pinch 26 18   3jaw Pinch  Tip Pinch 24  17 16.5  15.5      AROM Left UE: wrist flexion 52 degrees from 42    Wrist extension 37 from 33    RD 15, UD 20  Treatment:    Modalities: 10  min  Fluidotherapy treatment left  forearm, wrist and hand with AROM x 10 min      Therapeutic Exercise: 15   min  Objective measurements taken. See above for details  UPGRADED HEP: Patient instructed on and completed with use of handout as follows:  Self PROM wrist exten/flexion, Prayer wrist extension stretch  Hand helper 20# x 20 (HEP)  Issued green therapy ball :  strength x 10, lateral and 3pt pinch strength x 10 each.     Manual Therapy: 20   min  Scar massage over dorsal wrist incision  Wrist distractions, PROM all wrist planes    Therapeutic Activity:     min      Neuromuscular Re-education:  min    Orthosis:   min      Wound Care:     min      Self Care/ADL   min      Other Treatment:   min   Review of MH benefits at wrist outside of clinic    ASSESSMENT:  Slight increases in wrist AROM since IE. Patient verbalizes and demonstrates good understanding and exercise technique of progressed HEP.    PLAN:   Continue with POC. 1 x week for ROM, strength and HEP progression.     Mita Barnes MS, OTR/L [id number]         "

## 2024-04-25 ENCOUNTER — TREATMENT (OUTPATIENT)
Dept: OCCUPATIONAL THERAPY | Facility: CLINIC | Age: 54
End: 2024-04-25
Payer: COMMERCIAL

## 2024-04-25 DIAGNOSIS — M19.039 WRIST ARTHRITIS: Primary | ICD-10-CM

## 2024-04-25 PROCEDURE — 97140 MANUAL THERAPY 1/> REGIONS: CPT | Mod: GO

## 2024-04-25 PROCEDURE — 97110 THERAPEUTIC EXERCISES: CPT | Mod: GO

## 2024-04-25 PROCEDURE — 97022 WHIRLPOOL THERAPY: CPT | Mod: GO

## 2024-04-25 NOTE — PROGRESS NOTES
"Occupational Therapy   Occupational Therapy Treatment    Patient Name: Eddie Young  MRN: [id number]  Today's Date: 4/25/2024     Insurance:  Visit number: 3 (2 of 6)  Insurance Type: Ambetter   Authorization info: Refer to insurance section in chart    Current Problem  1. Wrist arthritis          Precautions     SUBJECTIVE:   \"Little tender today.\" Pt reports he purchased a hand helper  tool online for home use.     Pain:           Left wrist 5/10   Description: tender,sore    Performing HEP?: Yes    OBJECTIVE:   HAND STRENGTH (Lbs) Setting III:    R L   Dynamometer  100 43 from 37   Lateral Pinch     3jaw Pinch  Tip Pinch          Treatment:    Modalities: 15  min  Fluidotherapy treatment left  forearm, wrist and hand with AROM x 15 min      Therapeutic Exercise: 15   min  Strength measurement taken  UPGRADED HEP: Pt instruction and practice of weight assisted stretches for wrist flexion/extension using 2#-3# wts.  Patient completed 2 each direction, x 2 mins each    Manual Therapy: 15  min  Scar massage over dorsal wrist incision  Wrist distractions, PROM all wrist planes    Therapeutic Activity:     min      Neuromuscular Re-education:  min    Orthosis:   min      Wound Care:     min      Self Care/ADL   min      Other Treatment:   min   Review of CP use for pain relief     ASSESSMENT:  Slight increase in pain levels this date. Increase of  strength by 6#. Patient verbalizes and demonstrates good understanding and technique of upgraded HEP.     PLAN:   Continue with POC. 1 x week for ROM, strength and HEP progression.     Mita Barnes MS, OTR/L [id number]             "

## 2024-05-02 ENCOUNTER — TREATMENT (OUTPATIENT)
Dept: OCCUPATIONAL THERAPY | Facility: CLINIC | Age: 54
End: 2024-05-02
Payer: COMMERCIAL

## 2024-05-02 DIAGNOSIS — M19.039 WRIST ARTHRITIS: Primary | ICD-10-CM

## 2024-05-02 PROBLEM — R06.83 SNORING: Status: ACTIVE | Noted: 2024-05-02

## 2024-05-02 PROBLEM — K58.9 IBS (IRRITABLE BOWEL SYNDROME): Status: ACTIVE | Noted: 2024-05-02

## 2024-05-02 PROBLEM — M72.2 PLANTAR FASCIITIS: Status: ACTIVE | Noted: 2019-01-11

## 2024-05-02 PROBLEM — G47.19 EXCESSIVE DAYTIME SLEEPINESS: Status: ACTIVE | Noted: 2024-05-02

## 2024-05-02 PROBLEM — M65.4 RADIAL STYLOID TENOSYNOVITIS: Status: ACTIVE | Noted: 2024-05-02

## 2024-05-02 PROBLEM — L57.0 ACTINIC KERATOSIS OF LEFT CHEEK: Status: ACTIVE | Noted: 2023-02-20

## 2024-05-02 PROBLEM — M65.4 TENOSYNOVITIS, DE QUERVAIN: Status: ACTIVE | Noted: 2024-05-02

## 2024-05-02 PROBLEM — I21.4 NSTEMI (NON-ST ELEVATION MYOCARDIAL INFARCTION) (MULTI): Status: ACTIVE | Noted: 2018-02-08

## 2024-05-02 PROBLEM — M25.532 CHRONIC WRIST PAIN, LEFT: Status: ACTIVE | Noted: 2024-05-02

## 2024-05-02 PROBLEM — S69.92XA LEFT WRIST INJURY: Status: ACTIVE | Noted: 2024-05-02

## 2024-05-02 PROBLEM — G89.29 CHRONIC WRIST PAIN, LEFT: Status: ACTIVE | Noted: 2024-05-02

## 2024-05-02 PROBLEM — F10.10 ALCOHOL ABUSE: Status: ACTIVE | Noted: 2018-01-27

## 2024-05-02 PROBLEM — R19.5 POSITIVE COLORECTAL CANCER SCREENING USING COLOGUARD TEST: Status: ACTIVE | Noted: 2024-05-02

## 2024-05-02 PROBLEM — D48.5 NEOPLASM OF UNCERTAIN BEHAVIOR OF SKIN OF FACE: Status: ACTIVE | Noted: 2024-05-02

## 2024-05-02 PROBLEM — M77.9 TENDINITIS: Status: ACTIVE | Noted: 2019-01-11

## 2024-05-02 PROBLEM — E66.9 OBESITY, CLASS I, BMI 30-34.9: Status: ACTIVE | Noted: 2018-06-21

## 2024-05-02 PROBLEM — M54.50 LOW BACK PAIN: Status: ACTIVE | Noted: 2024-05-02

## 2024-05-02 PROBLEM — L81.4 OTHER MELANIN HYPERPIGMENTATION: Status: ACTIVE | Noted: 2023-02-20

## 2024-05-02 PROBLEM — I25.10 ARTERIOSCLEROSIS OF CORONARY ARTERY: Status: ACTIVE | Noted: 2021-04-02

## 2024-05-02 PROBLEM — D22.30 ATYPICAL NEVUS OF FACE: Status: ACTIVE | Noted: 2024-05-02

## 2024-05-02 PROBLEM — J10.1 INFLUENZA A: Status: ACTIVE | Noted: 2024-05-02

## 2024-05-02 PROBLEM — E66.811 OBESITY, CLASS I, BMI 30-34.9: Status: ACTIVE | Noted: 2018-06-21

## 2024-05-02 PROBLEM — L91.8 SKIN TAG: Status: ACTIVE | Noted: 2024-05-02

## 2024-05-02 PROBLEM — L20.9 ATOPIC DERMATITIS: Status: ACTIVE | Noted: 2024-05-02

## 2024-05-02 PROBLEM — R50.9 FEVER: Status: ACTIVE | Noted: 2024-05-02

## 2024-05-02 PROBLEM — I25.2 HISTORY OF MYOCARDIAL INFARCTION: Status: ACTIVE | Noted: 2022-11-15

## 2024-05-02 PROBLEM — E78.2 MIXED HYPERLIPIDEMIA: Status: ACTIVE | Noted: 2023-10-23

## 2024-05-02 PROBLEM — R21 LOCALIZED RASH: Status: ACTIVE | Noted: 2024-05-02

## 2024-05-02 PROBLEM — C44.329 SQUAMOUS CELL CARCINOMA OF SKIN OF OTHER PARTS OF FACE: Status: ACTIVE | Noted: 2023-02-20

## 2024-05-02 PROCEDURE — 97110 THERAPEUTIC EXERCISES: CPT | Mod: GO

## 2024-05-02 PROCEDURE — 97022 WHIRLPOOL THERAPY: CPT | Mod: GO

## 2024-05-02 PROCEDURE — 97140 MANUAL THERAPY 1/> REGIONS: CPT | Mod: GO

## 2024-05-02 RX ORDER — CHOLECALCIFEROL (VITAMIN D3) 25 MCG
1000 TABLET ORAL
COMMUNITY

## 2024-05-02 RX ORDER — CLOPIDOGREL BISULFATE 75 MG/1
75 TABLET ORAL
COMMUNITY

## 2024-05-02 NOTE — PROGRESS NOTES
"Occupational Therapy   Occupational Therapy Treatment    Patient Name: Eddie Young  MRN: [id number]  Today's Date: 5/2/2024     Insurance:  Visit number: 4 (3 of 6)  Insurance Type: Ambetter   Authorization info: Refer to insurance section in chart    Current Problem  1. Wrist arthritis          Precautions     SUBJECTIVE:    \"Pain much better than last time\"     Pain:           Left wrist 4 /10   Description: tender,sore    Performing HEP?: Yes    OBJECTIVE:   HAND AROM:   Wrist extension 40 from 37  Wrist flexion: 55 from 52    Treatment:  Modalities: 15  min  Fluidotherapy treatment left  forearm, wrist and hand with AROM x 15 min  Hand covered , wrist wrap coban  due to covered cut on IF.      Therapeutic Exercise:  10 min   Objective measurements taken. See above for details   UPGRADED HEP: Patient instructed on and completed with use of handout as follows:   Table stretches/AAROM  for wrist flexion/exten, RD UD . Also education on wrist    Extension stretch using counter/surface.Handout issued.    ROM wand for F/A wrist x 1 min    Manual Therapy: 15  min  Scar massage over dorsal wrist incision  Wrist distractions, PROM all wrist planes    Therapeutic Activity:     min      Neuromuscular Re-education:  min    Orthosis:   min      Wound Care:     min      Self Care/ADL   min      Other Treatment:   min      ASSESSMENT:  Steady increases in wrist AROM with decreased pain reports since last tx session. Good challenge and tolerance to progressed HEP.      PLAN:   Continue with POC. 1 x week for ROM, strength and HEP progression. Measure  strength.     Mita Barnes MS, OTR/L [id number]                 "

## 2024-05-08 ENCOUNTER — TREATMENT (OUTPATIENT)
Dept: OCCUPATIONAL THERAPY | Facility: CLINIC | Age: 54
End: 2024-05-08
Payer: COMMERCIAL

## 2024-05-08 ENCOUNTER — DOCUMENTATION (OUTPATIENT)
Dept: OCCUPATIONAL THERAPY | Facility: CLINIC | Age: 54
End: 2024-05-08
Payer: COMMERCIAL

## 2024-05-08 DIAGNOSIS — M19.039 WRIST ARTHRITIS: Primary | ICD-10-CM

## 2024-05-08 NOTE — PROGRESS NOTES
Occupational Therapy                 Therapy Communication Note    Patient Name: Eddie Young  MRN: [id number]  Today's Date: 5/8/2024     Discipline: Occupational Therapy    Missed Visit Reason:      Missed Time: Cancel    Comment:

## 2024-05-15 ENCOUNTER — TREATMENT (OUTPATIENT)
Dept: OCCUPATIONAL THERAPY | Facility: CLINIC | Age: 54
End: 2024-05-15
Payer: COMMERCIAL

## 2024-05-15 DIAGNOSIS — M19.039 WRIST ARTHRITIS: ICD-10-CM

## 2024-05-15 PROCEDURE — 97140 MANUAL THERAPY 1/> REGIONS: CPT | Mod: GO

## 2024-05-15 PROCEDURE — 97110 THERAPEUTIC EXERCISES: CPT | Mod: GO

## 2024-05-15 PROCEDURE — 97022 WHIRLPOOL THERAPY: CPT | Mod: GO

## 2024-05-15 NOTE — PROGRESS NOTES
"Occupational Therapy   Occupational Therapy Discharge    Patient Name: Eddie Young  MRN: [id number]  Today's Date: 5/15/2024     Insurance:  Visit number: 5 (4 of 6)  Insurance Type: Ambetter   Authorization info: Refer to insurance section in chart    Current Problem  1. Wrist arthritis  Follow Up In Occupational Therapy        Precautions     SUBJECTIVE:   \"Think I am doing good.\" Reassessment completed    Pain:           Left wrist 2 1/2 /10   Description: tender,sore    Performing HEP?: Yes    OBJECTIVE:   HAND AROM:   Wrist extension 46 from 33  Wrist flexion: 55 from 42  RD 8 from 5  UD 21 from 13    HAND STRENGTH (Lbs)   R L   Dynamometer  102 53 from 37   Lateral Pinch 27.5 16.5   3jaw Pinch 22 16      MMT: Left elbow, forearm , wrist 4+/5     Treatment:  Modalities: 15  min  Fluidotherapy treatment left  forearm, wrist and hand with AROM x 15 min      Therapeutic Exercise:  10 min   Objective measurements taken. See above for details  Review of HEP, focus, and progression, including LLPS, RD/UD table stretches and strengthening.     Manual Therapy: 15  min  Scar massage over dorsal wrist incision  Wrist distractions, PROM all wrist planes    Therapeutic Activity:     min      Neuromuscular Re-education:  min    Orthosis:   min      Wound Care:     min      Self Care/ADL   min      Other Treatment:   min      ASSESSMENT:  Pt made steady gains with fxal ROM, strength and return to all daily fxal activities.     PLAN:   Resolved       OT Goals       Pt to improve left wrist active flexion/extension by > 10 degrees or better for increase ease with ADLS. (Met)       Start:  03/25/24    Expected End:  05/24/24    Resolved:  05/15/24         Patient to increase  strength to WFL of Left hand for ease with lifting and carrying tasks and IADLS.  (Adequate for Discharge)       Start:  03/25/24    Expected End:  05/24/24    Resolved:  05/15/24         Patient to improve m.strength of left forearm/wrist to 4/5 or " better for return to leisure activities (ie. fishing/hunting.). (Met)       Start:  03/25/24    Expected End:  05/24/24    Resolved:  05/15/24            OT Problem       PATIENT WILL be independent with pain reduction techs OF 2/10 left wrist DEMONSTRATING A REDUCTION OF OVERALL PAIN (Adequate for Discharge)       Start:  03/25/24    Expected End:  05/24/24    Resolved:  05/15/24         PATIENT WILL DEMONSTRATE INDEPENDENCE IN HOME PROGRAM FOR SUPPORT OF PROGRESSION (Met)       Start:  03/25/24    Expected End:  05/24/24    Resolved:  05/15/24            Discharge OT services. Patient completed  5 tx sessions. Pt in agreement with plan and will continue efforts outside of clinic with HEP.     Mita Barnes MS, OTR/L [id number]

## 2024-07-14 NOTE — PROGRESS NOTES
Counseling:  The patient was counseled regarding diagnostic results, instructions for management, risk factor reductions, prognosis, patient and family education, impressions, risks and benefits of treatment options and importance of compliance with treatment.      Chief Complaint:  The patient presents today for cardiovascular evaluation of CAD and hyperlipidemia.     History Of Present Illness:    Eddie Young is a 54 y.o. male patient whose PMH is significant for CAD with h/o NSTEMI s/p PCI of proximal to mid second obtuse marginal 01/29/2018, hyperlipidemia, SCC of skin, and h/o alcohol abuse. He presents today to establish cardiovascular care for the evaluation and management of CAD and hyperlipidemia. The patient states that he was previously followed by cardiology at [facility], but since changing insurance companies his cardiologist is no longer in his network. The patient denies any CP, chest discomfort or SOB. He reports fatigue which is chronic and at baseline. He states that he does have a diagnosis of moderate sleep apnea, but is not currently on CPAP therapy s/t intolerance. EKG today shows NSR with no acute changes. The patient is compliant with his prescribed medications.     Past Surgical History:  He has a past surgical history that includes Coronary angioplasty with stent (2018); Appendectomy; and Hernia repair.      Social History:  He reports that he has never smoked. He has never used smokeless tobacco. He reports current alcohol use. He reports that he does not use drugs.    Family History:  No family history on file.     Allergies:  Patient has no known allergies.    Outpatient Medications:  Current Outpatient Medications   Medication Instructions    aspirin 81 mg chewable tablet 1 tablet, oral, Daily    atorvastatin (Lipitor) 80 mg tablet 1 tablet, oral, Daily    cholecalciferol (VITAMIN D-3) 1,000 Units, oral, Daily RT    clopidogrel (PLAVIX) 75 mg, oral    glucosamine HCl 1,500 mg tablet 1  "tablet, oral, Daily    melatonin 3 mg, oral, Daily RT    metoprolol tartrate (LOPRESSOR) 25 mg, oral, 2 times daily    multivitamin tablet 1 tablet, oral, Daily RT    nitroglycerin (NITROSTAT) 0.4 mg, sublingual    ticagrelor (Brilinta) 90 mg tablet         Last Recorded Vitals:  Vitals:    07/15/24 1210   BP: (!) 138/98   BP Location: Left arm   Pulse: 77   Weight: 106 kg (234 lb)   Height: 1.854 m (6' 1\")       Review of Systems   Constitutional: Positive for malaise/fatigue.   Respiratory:  Positive for sleep disturbances due to breathing.    All other systems reviewed and are negative.     Physical Exam:  Constitutional:       Appearance: Healthy appearance. Not in distress.   Neck:      Vascular: No JVR. JVD normal.   Pulmonary:      Effort: Pulmonary effort is normal.      Breath sounds: Normal breath sounds. No wheezing. No rhonchi. No rales.   Chest:      Chest wall: Not tender to palpatation.   Cardiovascular:      PMI at left midclavicular line. Normal rate. Regular rhythm. Normal S1. Normal S2.       Murmurs: There is no murmur.      No gallop.  No click. No rub.   Pulses:     Intact distal pulses.   Edema:     Peripheral edema absent.   Abdominal:      General: Bowel sounds are normal.      Palpations: Abdomen is soft.      Tenderness: There is no abdominal tenderness.   Musculoskeletal: Normal range of motion.         General: No tenderness. Skin:     General: Skin is warm and dry.   Neurological:      General: No focal deficit present.      Mental Status: Alert and oriented to person, place and time.            Last Labs:  CBC -  Lab Results   Component Value Date    WBC 6.4 08/08/2022    HGB 16.1 08/08/2022    HCT 46.4 08/08/2022    MCV 89 08/08/2022     08/08/2022       CMP -  Lab Results   Component Value Date    CALCIUM 9.2 08/08/2022    PROT 7.0 08/08/2022    ALBUMIN 4.3 08/08/2022    AST 27 08/08/2022    ALT 40 08/08/2022    ALKPHOS 73 08/08/2022    BILITOT 0.8 08/08/2022       LIPID PANEL " -   Lab Results   Component Value Date    CHOL 129 08/08/2022    TRIG 112 08/08/2022    HDL 44.4 08/08/2022    CHHDL 2.9 08/08/2022    LDLF 62 08/08/2022    VLDL 22 08/08/2022       RENAL FUNCTION PANEL -   Lab Results   Component Value Date    GLUCOSE 105 (H) 08/08/2022     08/08/2022    K 4.6 08/08/2022     08/08/2022    CO2 27 08/08/2022    ANIONGAP 11 08/08/2022    BUN 17 08/08/2022    CREATININE 0.90 08/08/2022    GFRMALE >90 08/08/2022    CALCIUM 9.2 08/08/2022    ALBUMIN 4.3 08/08/2022          Last Cardiology Tests:  11/18/2021 - Stress Echo  1. Procedure narrative: Treadmill exercise testing was performed using the Kemar protocol. The patient exercised for 10 min 52 sec, to a maximal work rate of 13.1 mets. Exercise was terminated due to moderate dyspnea and moderate fatigue.   2. Left ventricle: Systolic function is normal by the biplane method of disks. The estimated ejection fraction is 64%.   3. Stress: Stress testing did not produce any symptoms suggestive of coronary artery disease. Functional capacity is above average.   4. Stress ECG conclusions: The stress ECG is normal. Duke scoring: exercise time of 11 min; maximum ST deviation of 0 mm; no angina; resulting score is 11.0. This score predicts a low risk of cardiac events.   5. Stress echo: There is no evidence for stress-induced ischemia.   6. Normal study after maximal exercise.     02/07/2020 - Vascular Lab Carotid Artery Duplex    Minimal bilateral carotid plaque; no stenosis greater than 50%.    01/29/2018 - Cardiac Catheterization ([facility])  1. Severe single vessel coronary artery disease.  2. Successful angioplasty and successful (drug-eluting) stent of the 99% stenosis in the proximal to mid second obtuse marginal artery.      Assessment/Plan   1) CAD with h/o NSTEMI s/p PCI of Proximal to Mid Second Obtuse Marginal 01/29/2018  On ASA 81 mg daily, atorvastatin 80 mg daily  Previously followed by cardiology at [facility] - no longer in  insurance network   Stress echo 11/18/2021 negative for ischemia  Denies CP, chest discomfort or SOB  BP stable  EKG shows NSR with no acute changes  Check CBC, CMP, Lipid Panel, Testosterone (as per patient's request)  Continue current medical Rx - refills for cholestyramine provided today as per patient's request (taken for overactive bowel).  F/U 1 year     2) Hyperlipidemia  On atorvastatin 80 mg daily   Lipid panel 08/08/2022 with LDL of 62  Check lipid panel  Continue current medical Rx  F/U 1 year     3) MEET  Reports fatigue - chronic and at baseline  Has a diagnosis of moderate MEET, but not on CPAP therapy s/t intolerance       Scribe Attestation  By signing my name below, I, Amarjit Camacho   attest that this documentation has been prepared under the direction and in the presence of Arthur Hutchison MD.

## 2024-07-15 ENCOUNTER — APPOINTMENT (OUTPATIENT)
Dept: CARDIOLOGY | Facility: CLINIC | Age: 54
End: 2024-07-15
Payer: COMMERCIAL

## 2024-07-15 ENCOUNTER — LAB (OUTPATIENT)
Dept: LAB | Facility: LAB | Age: 54
End: 2024-07-15
Payer: COMMERCIAL

## 2024-07-15 VITALS
WEIGHT: 234 LBS | DIASTOLIC BLOOD PRESSURE: 98 MMHG | HEIGHT: 73 IN | BODY MASS INDEX: 31.01 KG/M2 | HEART RATE: 77 BPM | SYSTOLIC BLOOD PRESSURE: 138 MMHG

## 2024-07-15 DIAGNOSIS — I25.10 ARTERIOSCLEROSIS OF CORONARY ARTERY: ICD-10-CM

## 2024-07-15 DIAGNOSIS — I25.10 ARTERIOSCLEROSIS OF CORONARY ARTERY: Primary | ICD-10-CM

## 2024-07-15 DIAGNOSIS — K58.8 OTHER IRRITABLE BOWEL SYNDROME: ICD-10-CM

## 2024-07-15 LAB
ALBUMIN SERPL BCP-MCNC: 4.4 G/DL (ref 3.4–5)
ALP SERPL-CCNC: 94 U/L (ref 33–120)
ALT SERPL W P-5'-P-CCNC: 33 U/L (ref 10–52)
ANION GAP SERPL CALC-SCNC: 11 MMOL/L (ref 10–20)
AST SERPL W P-5'-P-CCNC: 24 U/L (ref 9–39)
BASOPHILS # BLD AUTO: 0.07 X10*3/UL (ref 0–0.1)
BASOPHILS NFR BLD AUTO: 0.8 %
BILIRUB SERPL-MCNC: 0.9 MG/DL (ref 0–1.2)
BUN SERPL-MCNC: 12 MG/DL (ref 6–23)
CALCIUM SERPL-MCNC: 9.1 MG/DL (ref 8.6–10.3)
CHLORIDE SERPL-SCNC: 106 MMOL/L (ref 98–107)
CHOLEST SERPL-MCNC: 161 MG/DL (ref 0–199)
CHOLESTEROL/HDL RATIO: 3.6
CO2 SERPL-SCNC: 25 MMOL/L (ref 21–32)
CREAT SERPL-MCNC: 0.88 MG/DL (ref 0.5–1.3)
EGFRCR SERPLBLD CKD-EPI 2021: >90 ML/MIN/1.73M*2
EOSINOPHIL # BLD AUTO: 0.23 X10*3/UL (ref 0–0.7)
EOSINOPHIL NFR BLD AUTO: 2.7 %
ERYTHROCYTE [DISTWIDTH] IN BLOOD BY AUTOMATED COUNT: 12.3 % (ref 11.5–14.5)
GLUCOSE SERPL-MCNC: 85 MG/DL (ref 74–99)
HCT VFR BLD AUTO: 47.5 % (ref 41–52)
HDLC SERPL-MCNC: 44.4 MG/DL
HGB BLD-MCNC: 16.6 G/DL (ref 13.5–17.5)
IMM GRANULOCYTES # BLD AUTO: 0.03 X10*3/UL (ref 0–0.7)
IMM GRANULOCYTES NFR BLD AUTO: 0.3 % (ref 0–0.9)
LDLC SERPL CALC-MCNC: 41 MG/DL
LYMPHOCYTES # BLD AUTO: 1.7 X10*3/UL (ref 1.2–4.8)
LYMPHOCYTES NFR BLD AUTO: 19.8 %
MCH RBC QN AUTO: 30.3 PG (ref 26–34)
MCHC RBC AUTO-ENTMCNC: 34.9 G/DL (ref 32–36)
MCV RBC AUTO: 87 FL (ref 80–100)
MONOCYTES # BLD AUTO: 0.59 X10*3/UL (ref 0.1–1)
MONOCYTES NFR BLD AUTO: 6.9 %
NEUTROPHILS # BLD AUTO: 5.97 X10*3/UL (ref 1.2–7.7)
NEUTROPHILS NFR BLD AUTO: 69.5 %
NON HDL CHOLESTEROL: 117 MG/DL (ref 0–149)
NRBC BLD-RTO: 0 /100 WBCS (ref 0–0)
PLATELET # BLD AUTO: 264 X10*3/UL (ref 150–450)
POTASSIUM SERPL-SCNC: 4.1 MMOL/L (ref 3.5–5.3)
PROT SERPL-MCNC: 7 G/DL (ref 6.4–8.2)
RBC # BLD AUTO: 5.47 X10*6/UL (ref 4.5–5.9)
SODIUM SERPL-SCNC: 138 MMOL/L (ref 136–145)
TRIGL SERPL-MCNC: 376 MG/DL (ref 0–149)
VLDL: 75 MG/DL (ref 0–40)
WBC # BLD AUTO: 8.6 X10*3/UL (ref 4.4–11.3)

## 2024-07-15 PROCEDURE — 80061 LIPID PANEL: CPT

## 2024-07-15 PROCEDURE — 99203 OFFICE O/P NEW LOW 30 MIN: CPT | Performed by: INTERNAL MEDICINE

## 2024-07-15 PROCEDURE — 93000 ELECTROCARDIOGRAM COMPLETE: CPT | Performed by: INTERNAL MEDICINE

## 2024-07-15 PROCEDURE — 80053 COMPREHEN METABOLIC PANEL: CPT

## 2024-07-15 PROCEDURE — 1036F TOBACCO NON-USER: CPT | Performed by: INTERNAL MEDICINE

## 2024-07-15 PROCEDURE — 85025 COMPLETE CBC W/AUTO DIFF WBC: CPT

## 2024-07-15 PROCEDURE — 36415 COLL VENOUS BLD VENIPUNCTURE: CPT

## 2024-07-15 PROCEDURE — 84402 ASSAY OF FREE TESTOSTERONE: CPT

## 2024-07-15 RX ORDER — CHOLESTYRAMINE 4 G/9G
1 POWDER, FOR SUSPENSION ORAL
Qty: 90 PACKET | Refills: 11 | Status: SHIPPED | OUTPATIENT
Start: 2024-07-15 | End: 2025-07-15

## 2024-07-15 ASSESSMENT — ENCOUNTER SYMPTOMS
SLEEP DISTURBANCES DUE TO BREATHING: 1
LOSS OF SENSATION IN FEET: 0
OCCASIONAL FEELINGS OF UNSTEADINESS: 0
DEPRESSION: 0

## 2024-07-15 NOTE — PATIENT INSTRUCTIONS
Continue all current medications as prescribed. Dr. Hutchison has provided refills for cholestyramine as per your request.  Please have blood work drawn at your earliest convenience. You will be notified of the results once they become available.    Followup with Dr. Hutchison in 1 year, sooner should any issues or concerns arise before then.     If you have any questions or cardiac concerns, please call our office at [phone].

## 2024-07-16 ENCOUNTER — TELEPHONE (OUTPATIENT)
Dept: CARDIOLOGY | Facility: CLINIC | Age: 54
End: 2024-07-16
Payer: COMMERCIAL

## 2024-07-16 DIAGNOSIS — I25.10 ARTERIOSCLEROSIS OF CORONARY ARTERY: Primary | ICD-10-CM

## 2024-07-16 DIAGNOSIS — E78.2 MIXED HYPERLIPIDEMIA: ICD-10-CM

## 2024-07-16 RX ORDER — ICOSAPENT ETHYL 1 G/1
2 CAPSULE ORAL
Qty: 360 CAPSULE | Refills: 3 | Status: SHIPPED | OUTPATIENT
Start: 2024-07-16 | End: 2025-07-16

## 2024-07-16 NOTE — TELEPHONE ENCOUNTER
7/16/24  1616  Returned call to patient,    Gave results to patient and plan for reducing Carbs, sweets and alcohol and starting Vascepa. Patient verbalized understanding of results and is agreeable to plan.      New Rx for Vascepa sent for approval.      7/16/24  0933  Called patient; no answer. Left voice message for patient to return call for results and directives from Dr. Hutchison.      ----- Message from Arthur Hutchison sent at 7/15/2024  4:20 PM EDT -----  Triglycerides are sig elevated. Low carb diet and try Vascepa

## 2024-07-20 LAB
TESTOSTERONE FREE (CHAN): 58 PG/ML (ref 35–155)
TESTOSTERONE,TOTAL,LC-MS/MS: 317 NG/DL (ref 250–1100)

## 2024-07-22 ENCOUNTER — APPOINTMENT (OUTPATIENT)
Dept: PRIMARY CARE | Facility: CLINIC | Age: 54
End: 2024-07-22
Payer: COMMERCIAL

## 2024-07-22 ENCOUNTER — LAB (OUTPATIENT)
Dept: LAB | Facility: LAB | Age: 54
End: 2024-07-22
Payer: COMMERCIAL

## 2024-07-22 VITALS
BODY MASS INDEX: 30.48 KG/M2 | WEIGHT: 230 LBS | HEIGHT: 73 IN | HEART RATE: 71 BPM | SYSTOLIC BLOOD PRESSURE: 122 MMHG | DIASTOLIC BLOOD PRESSURE: 76 MMHG

## 2024-07-22 DIAGNOSIS — Z00.00 ANNUAL PHYSICAL EXAM: ICD-10-CM

## 2024-07-22 DIAGNOSIS — Z00.00 ANNUAL PHYSICAL EXAM: Primary | ICD-10-CM

## 2024-07-22 DIAGNOSIS — G47.33 OSA (OBSTRUCTIVE SLEEP APNEA): ICD-10-CM

## 2024-07-22 DIAGNOSIS — I25.2 HISTORY OF MYOCARDIAL INFARCTION: ICD-10-CM

## 2024-07-22 DIAGNOSIS — I25.10 CORONARY ARTERY DISEASE INVOLVING NATIVE CORONARY ARTERY OF NATIVE HEART WITHOUT ANGINA PECTORIS: ICD-10-CM

## 2024-07-22 DIAGNOSIS — E66.9 OBESITY, CLASS I, BMI 30-34.9: ICD-10-CM

## 2024-07-22 DIAGNOSIS — E78.2 MIXED HYPERLIPIDEMIA: ICD-10-CM

## 2024-07-22 LAB
APPEARANCE UR: CLEAR
BACTERIA #/AREA URNS AUTO: ABNORMAL /HPF
BILIRUB UR STRIP.AUTO-MCNC: NEGATIVE MG/DL
COLOR UR: YELLOW
EST. AVERAGE GLUCOSE BLD GHB EST-MCNC: 100 MG/DL
GLUCOSE UR STRIP.AUTO-MCNC: NORMAL MG/DL
HBA1C MFR BLD: 5.1 %
KETONES UR STRIP.AUTO-MCNC: NEGATIVE MG/DL
LEUKOCYTE ESTERASE UR QL STRIP.AUTO: NEGATIVE
MUCOUS THREADS #/AREA URNS AUTO: ABNORMAL /LPF
NITRITE UR QL STRIP.AUTO: NEGATIVE
PH UR STRIP.AUTO: 5.5 [PH]
PROT UR STRIP.AUTO-MCNC: NORMAL MG/DL
PSA SERPL-MCNC: 0.78 NG/ML
RBC # UR STRIP.AUTO: NEGATIVE /UL
RBC #/AREA URNS AUTO: ABNORMAL /HPF
SP GR UR STRIP.AUTO: 1.03
UROBILINOGEN UR STRIP.AUTO-MCNC: NORMAL MG/DL
WBC #/AREA URNS AUTO: ABNORMAL /HPF

## 2024-07-22 PROCEDURE — 83036 HEMOGLOBIN GLYCOSYLATED A1C: CPT

## 2024-07-22 PROCEDURE — 81001 URINALYSIS AUTO W/SCOPE: CPT

## 2024-07-22 PROCEDURE — 1036F TOBACCO NON-USER: CPT | Performed by: INTERNAL MEDICINE

## 2024-07-22 PROCEDURE — 84153 ASSAY OF PSA TOTAL: CPT

## 2024-07-22 PROCEDURE — 36415 COLL VENOUS BLD VENIPUNCTURE: CPT

## 2024-07-22 PROCEDURE — 99396 PREV VISIT EST AGE 40-64: CPT | Performed by: INTERNAL MEDICINE

## 2024-07-22 PROCEDURE — 3008F BODY MASS INDEX DOCD: CPT | Performed by: INTERNAL MEDICINE

## 2024-07-22 ASSESSMENT — ENCOUNTER SYMPTOMS: OCCASIONAL FEELINGS OF UNSTEADINESS: 0

## 2024-07-22 NOTE — PROGRESS NOTES
"Subjective   Patient ID: Eddie Young is a 54 y.o. male who presents for Annual Exam.    HPI   Eddie is a 54 year-old male comes to establish primary care. Last colonoscopy done on 11/15/22 was normal and 10 year F/U was recommended at the time. Patient has been advised to get the Shingrix vaccine at his local pharmacy.  He does have history of CAD, status post MI with stent placement for which he was recently evaluated by his cardiologist.  Labs done recently including CMP, CBC, testosterone level as well as lipids were all unremarkable.  Patient is compliant with his medications to which she reports no side effects.  Patient claims he did have a home sleep study done at some point which did reveal sleep apnea but he is currently not on any treatment for this.  He complains of fatigue, difficulty losing weight as well as daytime somnolence.  Pt denies, fever, chills, CP, SOB, abdominal pain, N/V or  symptoms. No HA, dizziness, numbness or weakness.  No loss of weight, loss of appetite, melena, rectal bleeding, mood or sleep issues reported.  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  Review of Systems  As per HPI.  Objective   /76 (BP Location: Right arm, Patient Position: Sitting, BP Cuff Size: Large adult)   Pulse 71   Ht 1.854 m (6' 1\")   Wt 104 kg (230 lb)   BMI 30.34 kg/m²     Physical Exam  General - well developed, well appearing, obese, middle-aged  male in no acute respiratory distress  Eyes - normal sclera and conjunctiva with no pallor or icterus, normal extraocular movements  ENT - normal external auditory canals and tympanic membranes, throat clear with no exudates  Neck - No JVD, thyromegaly or lymphadenopathy  Lungs - no respiratory distress and lungs clear to auscultation bilaterally  Heart - normal S1, S2 with normal heart rate, rhythm and no murmurs   Abdomen - soft, nontender with no masses or organomegaly  Extremities - no cyanosis or pedal " edema  Neuro - grossly normal neuro exam with no focal neuro deficits  Psych - normal mental status, mood and affect   Skin - no rashes or ulcers  MSK - normal gait with grossly normal ROM of major joints  Assessment/Plan        1.  Annual wellness exam-rest of labs other than what has already been done by his cardiologist will be ordered today, patient has been advised to get the Shingrix vaccine at his local pharmacy, he will be due for a colonoscopy in November 2032  2.  Hyperlipidemia-patient is on atorvastatin, recent lipids were okay  3.  History of MI, status post stent placement-patient is being managed by cardiology and will follow-up with Dr. Hutchison as advised  4.  Obesity with a BMI of over 30-diet and exercise discussed and encouraged, hemoglobin A1c will be checked  5.  Obstructive sleep apnea with fatigue and daytime somnolence-patient will be referred to sleep medicine, he claims he is unable to tolerate a mask, ENT referral will also be provided to consider surgical treatment for sleep apnea  Follow-up in 1 year or sooner if needed.  This note was partially generated using the Dragon voice recognition system. There may be some incorrect words, spelling and punctuation errors that were not corrected prior to committing the note to the patient's medical record.

## 2024-08-05 ENCOUNTER — APPOINTMENT (OUTPATIENT)
Dept: DERMATOLOGY | Facility: CLINIC | Age: 54
End: 2024-08-05
Payer: COMMERCIAL

## 2024-08-13 ENCOUNTER — APPOINTMENT (OUTPATIENT)
Dept: DERMATOLOGY | Facility: CLINIC | Age: 54
End: 2024-08-13
Payer: COMMERCIAL

## 2024-08-13 DIAGNOSIS — L81.4 LENTIGO: ICD-10-CM

## 2024-08-13 DIAGNOSIS — L82.1 SEBORRHEIC KERATOSIS: ICD-10-CM

## 2024-08-13 DIAGNOSIS — D22.9 BENIGN NEVUS: ICD-10-CM

## 2024-08-13 DIAGNOSIS — L57.9 SKIN CHANGES DUE TO CHRONIC EXPOSURE TO NONIONIZING RADIATION: ICD-10-CM

## 2024-08-13 DIAGNOSIS — L90.5 SCAR CONDITIONS AND FIBROSIS OF SKIN: ICD-10-CM

## 2024-08-13 DIAGNOSIS — Z85.828 HISTORY OF NONMELANOMA SKIN CANCER: ICD-10-CM

## 2024-08-13 DIAGNOSIS — L57.0 ACTINIC KERATOSIS: Primary | ICD-10-CM

## 2024-08-13 PROCEDURE — 17000 DESTRUCT PREMALG LESION: CPT | Performed by: NURSE PRACTITIONER

## 2024-08-13 PROCEDURE — 1036F TOBACCO NON-USER: CPT | Performed by: NURSE PRACTITIONER

## 2024-08-13 PROCEDURE — 99213 OFFICE O/P EST LOW 20 MIN: CPT | Performed by: NURSE PRACTITIONER

## 2024-08-13 PROCEDURE — 17003 DESTRUCT PREMALG LES 2-14: CPT | Performed by: NURSE PRACTITIONER

## 2024-08-13 NOTE — PATIENT INSTRUCTIONS

## 2024-08-13 NOTE — PROGRESS NOTES
Subjective     Eddie Young is a 54 y.o. male who presents for the following: Skin Check (face).     Review of Systems:  No other skin or systemic complaints other than what is documented elsewhere in the note.    The following portions of the chart were reviewed this encounter and updated as appropriate:   Tobacco  Allergies  Meds  Problems  Med Hx  Surg Hx         Skin Cancer History  No skin cancer on file.      Specialty Problems          Dermatology Problems    Actinic keratosis of left cheek    Other melanin hyperpigmentation    Squamous cell carcinoma of skin of other parts of face    Atopic dermatitis    Atypical nevus of face    Localized rash    Neoplasm of uncertain behavior of skin of face    Skin tag        Objective   Well appearing patient in no apparent distress; mood and affect are within normal limits.    A focused skin examination was performed waist up. All findings within normal limits unless otherwise noted below.    Assessment/Plan   1. Actinic keratosis (9)  Left Malar Cheek (2), Left Nasal Sidewall, Left Posterior Neck, Right Ear, Right Malar Cheek, Right Temple (2), Right Zygomatic Area  Thin erythematous papules with gritty scale    WHAT IS ACTINIC KERATOSIS?   - Actinic keratosis (AK) is a skin condition caused by sun damage. It causes scaly, rough, or bumpy spots on the skin.  - If left alone, AKs may turn into a skin cancer. People who burn easily or have trouble tanning are at more risk for developing AKs.   - There is no one test for AKs and diagnosis is made by clinical appearance. Treatment options include cryotherapy, therapy with lights, and various creams (e.g., topical 5-fluorocuracil, imiquimod).       To lower the chance of getting AK, you can:       ?  Stay out of the sun in the middle of the day (from 10 a.m. to 4 p.m.)       ?  Wear sunscreen - An SPF of at least 30 is best. The SPF number is on the sunscreen bottle or tube.       ?  Wear a wide-brimmed hat,  long-sleeved shirt, long pants, or long skirt outside. A baseball hat does not give much protection.        ?  Do not use tanning beds.        ?  Keep a low-fat diet, less than 21% of calories should come from fat       ?  Take Vitamin B3 (nicotinomide) 500mg twice daily.      YOUR TREATMENT PLAN  - At this time I recommend treatment with cryotherapy.  - Possible side effects of liquid nitrogen treatment reviewed including formation of blisters, crusting, tenderness, scar, and discoloration which may be permanent.  - Patient advised to return the office for re-evaluation if the treated lesion(s) do not resolve within 4-6 weeks. Patient verbalizes understanding.    Destr of lesion - Left Malar Cheek (2), Left Nasal Sidewall, Left Posterior Neck, Right Ear, Right Malar Cheek, Right Temple (2), Right Zygomatic Area  Complexity: simple    Destruction method: cryotherapy    Informed consent: discussed and consent obtained    Timeout:  patient name, date of birth, surgical site, and procedure verified  Lesion destroyed using liquid nitrogen: Yes    Cryotherapy cycles:  2  Outcome: patient tolerated procedure well with no complications    Post-procedure details: wound care instructions given      2. Benign nevus  Scattered, uniform and benign-appearing, regular brown melanocytic papules and macules.    The present appearance of the lesion is not worrisome but it should continue to be observed and testing/treatment may be warranted if change occurs.    3. Seborrheic keratosis  Stuck on verrucous, tan-brown papules and plaques.      Seborrheic keratoses are common noncancerous (benign) growths of unknown cause seen in adults due to a thickening of an area of the top skin layer. Seborrheic keratoses may appear as if they are stuck on to the skin. They have distinct borders, and they may appear as papules (small, solid bumps) or plaques (solid, raised patches that are bigger than a thumbnail). They may be the same color as  your skin, or they may be pink, light brown, darker brown, or very dark brown, or sometimes may appear black.    There is no way to prevent new seborrheic keratoses from forming. Seborrheic keratoses can be removed, but removal is considered a cosmetic issue and is usually not covered by insurance.    PLAN  No treatment is needed unless there is irritation from clothing, such as itching or bleeding.  2.   Some lotions containing alpha hydroxy acids, salicylic acid, or urea may make the areas feel smoother with regular use but will not eliminate them.    4. Lentigo  Scattered tan macules in sun-exposed areas.    A solar lentigo (plural, solar lentigines), also known as a sun-induced freckle or senile lentigo, is a dark (hyperpigmented) lesion caused by natural or artificial ultraviolet (UV) light. Solar lentigines may be single or multiple. This type of lentigo is different from a simple lentigo (lentigo simplex) because it is caused by exposure to UV light. Solar lentigines are benign, but they do indicate excessive sun exposure, a risk factor for the development of skin cancer.    To prevent solar lentigines, avoid exposure to sunlight in midday (10 AM to 3 PM), wear sun-protective clothing (tightly woven clothes and hats), and apply sunscreen (SPF 30 UVA and UVB block).    The present appearance of the lesion is not worrisome but it should continue to be observed and testing/treatment may be warranted if change occurs.    5. Scar conditions and fibrosis of skin  Left Malar Cheek  Well healed scar at the site(s) of prior treatment with no evidence of recurrence.          The scar is clear, there is no evidence of recurrence.  The present appearance of the scar is not worrisome but it should continue to be observed and testing/treatment may be warranted if change occurs.      6. History of nonmelanoma skin cancer    ABCDEs of melanoma and atypical moles were discussed with the patient.    Patient was instructed to  perform monthly self skin examination.  We recommended that the patient have regular full skin exams given an increased risk of subsequent skin cancers.    The patient was instructed to use sun protective behaviors including use of broad spectrum sunscreens and sun protective clothing to reduce risk of skin cancers.    Warning signs of non-melanoma skin cancer discussed.    7. Skin changes due to chronic exposure to nonionizing radiation  Actinic changes in the form of freckles, lentigines and hyper/hypopigmentation     ABCDEs of melanoma and atypical moles were discussed with the patient.    Patient was instructed to perform monthly self skin examination.  We recommended that the patient have regular full skin exams given an increased risk of subsequent skin cancers.    The patient was instructed to use sun protective behaviors including use of broad spectrum sunscreens and sun protective clothing to reduce risk of skin cancers.    Warning signs of non-melanoma skin cancer discussed.        Return to clinic in 1 year for skin check/follow up or sooner if needed

## 2024-08-28 NOTE — PROGRESS NOTES
[facility]  New Visit Note        HISTORY OF PRESENT ILLNESS     The patient's referring provider is: Kamryn Gillespie MD    HISTORY OF PRESENT ILLNESS   Eddie Young is a 54 y.o. male who presents to a [facility] for a sleep medicine evaluation with concerns of No chief complaint on file..     PAST SLEEP HISTORY    Patient has the following sleep-related diagnoses and sleep study results: moderate MEET per sleep study on 10/8/2021    He did not start any treatment at this time.    CURRENT HISTORY    On today's visit, the patient reports symptoms of snoring, unrefreshing sleep, excessive daytime sleepiness, and frequent waking at night.    His biggest complaint is he is always tired.  He recently had his testosterone checked and his levels were at the lower end of normal.    He did have more events on his back during the sleep study although he notes he likes sleeping on his back and he struggles to sleep on his side due to shoulder discomfort.  He sometimes sleeps reclined as well but still snores in reclined position.    He does not think he will be able to tolerate either a CPAP machine or an oral appliance.  He had difficulty with mouthguard notes in the past.    He has noted to have cardiac history including coronary artery disease, NSTEMI with PCI. Denies history of HTN.    STOP  3 STO  BANG 2 AG    Sleep schedule  on weekdays / work days:  Usual Bedtime  10 p  Falls asleep around  1030p  Wake time  8 a    Sleep schedule  on weekends/non work days :  same    Naps:   no    Average sleep duration 4-5 hours/day    Preferred sleeping position: back  Sleep-related ROS:    Sleep Initiation: no problems going to sleep    Sleep Maintenance: wakes 4-5 times per night    Recreational drug use  Smoking: never  Alcohol consumption: daily  Caffeine consumption:  never  Marijuana: never    ESS: 7      PHYSICAL EXAM     VITAL SIGNS: /67   Pulse 80    "Resp 16   Ht 1.854 m (6' 1\")   Wt 107 kg (236 lb 3.2 oz)   BMI 31.16 kg/m²      PREVIOUS WEIGHTS:  Wt Readings from Last 3 Encounters:   08/29/24 107 kg (236 lb 3.2 oz)   07/22/24 104 kg (230 lb)   07/15/24 106 kg (234 lb)       PHYSICAL EXAM: GENERAL: alert oriented x 3 pleasant and cooperative no acute distress  MODIFIED MALLAMPATI SCORE: 2+  LATERAL PHARYNGEAL WALL: 2+  NECK EXAM: normal supple no adenopathy    RESULTS/DATA     No results found for: \"IRON\", \"TRANSFERRIN\", \"IRONSAT\", \"TIBC\", \"FERRITIN\"    ASSESSMENT/PLAN     Mr. Young is a 54 y.o. male and was referred to the [facility] for the following issues:    OBSTRUCTIVE SLEEP APNEA / SLEEPINESS/ FREQUENT WAKING  -Ordering sleep study to evaluate  -Discussed he may try conservative therapy for sleep apnea including avoiding supine sleep  -Recommend avoidance of alcohol near bedtime as this can exacerbate severity of sleep apnea  -Discussed inspire as an option he may consider if he is unable to tolerate CPAP therapy    BMI>30  -Body mass index is 31.16 kg/m².  today  -With sufficient weight loss may no longer require treatment for MEET    ELEVATED BP / CAD / NSTEMI  -BP high today, not on medications for HTN  -Followed by cardiology    Followup 3 weeks after sleep study to review results        "

## 2024-08-29 ENCOUNTER — OFFICE VISIT (OUTPATIENT)
Dept: SLEEP MEDICINE | Facility: CLINIC | Age: 54
End: 2024-08-29
Payer: COMMERCIAL

## 2024-08-29 VITALS
WEIGHT: 236.2 LBS | BODY MASS INDEX: 31.3 KG/M2 | RESPIRATION RATE: 16 BRPM | DIASTOLIC BLOOD PRESSURE: 67 MMHG | HEART RATE: 80 BPM | SYSTOLIC BLOOD PRESSURE: 161 MMHG | HEIGHT: 73 IN

## 2024-08-29 DIAGNOSIS — I25.10 ARTERIOSCLEROSIS OF CORONARY ARTERY: ICD-10-CM

## 2024-08-29 DIAGNOSIS — G47.19 EXCESSIVE DAYTIME SLEEPINESS: ICD-10-CM

## 2024-08-29 DIAGNOSIS — G47.33 OSA (OBSTRUCTIVE SLEEP APNEA): Primary | ICD-10-CM

## 2024-08-29 DIAGNOSIS — G47.00 PERSISTENT DISORDER OF INITIATING OR MAINTAINING SLEEP: ICD-10-CM

## 2024-08-29 DIAGNOSIS — R06.83 SNORING: ICD-10-CM

## 2024-08-29 PROCEDURE — 99214 OFFICE O/P EST MOD 30 MIN: CPT | Performed by: PHYSICIAN ASSISTANT

## 2024-08-29 PROCEDURE — 1036F TOBACCO NON-USER: CPT | Performed by: PHYSICIAN ASSISTANT

## 2024-08-29 PROCEDURE — 99204 OFFICE O/P NEW MOD 45 MIN: CPT | Performed by: PHYSICIAN ASSISTANT

## 2024-08-29 PROCEDURE — 3008F BODY MASS INDEX DOCD: CPT | Performed by: PHYSICIAN ASSISTANT

## 2024-08-29 ASSESSMENT — SLEEP AND FATIGUE QUESTIONNAIRES
HOW LIKELY ARE YOU TO NOD OFF OR FALL ASLEEP WHILE SITTING AND READING: MODERATE CHANCE OF DOZING
HOW LIKELY ARE YOU TO NOD OFF OR FALL ASLEEP WHILE WATCHING TV: SLIGHT CHANCE OF DOZING
HOW LIKELY ARE YOU TO NOD OFF OR FALL ASLEEP WHILE SITTING QUIETLY AFTER LUNCH WITHOUT ALCOHOL: SLIGHT CHANCE OF DOZING
HOW LIKELY ARE YOU TO NOD OFF OR FALL ASLEEP IN A CAR, WHILE STOPPED FOR A FEW MINUTES IN TRAFFIC: WOULD NEVER DOZE
HOW LIKELY ARE YOU TO NOD OFF OR FALL ASLEEP WHILE LYING DOWN TO REST IN THE AFTERNOON WHEN CIRCUMSTANCES PERMIT: MODERATE CHANCE OF DOZING
HOW LIKELY ARE YOU TO NOD OFF OR FALL ASLEEP WHEN YOU ARE A PASSENGER IN A CAR FOR AN HOUR WITHOUT A BREAK: SLIGHT CHANCE OF DOZING
HOW LIKELY ARE YOU TO NOD OFF OR FALL ASLEEP WHILE SITTING INACTIVE IN A PUBLIC PLACE: WOULD NEVER DOZE
HOW LIKELY ARE YOU TO NOD OFF OR FALL ASLEEP WHILE SITTING AND TALKING TO SOMEONE: WOULD NEVER DOZE
ESS TOTAL SCORE: 7

## 2024-08-29 ASSESSMENT — ENCOUNTER SYMPTOMS: DEPRESSION: 0

## 2024-08-29 NOTE — PATIENT INSTRUCTIONS
Thank you for coming to the Sleep Medicine Clinic today! Your sleep medicine provider today was: Krish Dempsey PA-C Below is a summary of your treatment plan, other important information, and our contact numbers:      TREATMENT PLAN     Call [phone]), option 3 to schedule your sleep study. When you have an appointment please call us back at [phone] to schedule a followup appointment 3-4 weeks after to review results.    I recommend trying positional therapy for MEET. This can treat sleep apnea without the need for PAP or other therapies. Sometimes people find it helpful to sew a tennis ball in a shirt to prevent rolling onto your back.    Here are some products that can assist in keeping you on your side.    https://www.[employer]/    https://[email or web address].[employer]/    https://[employer]/     Obstructive Sleep Apnea (MEET) is a sleep disorder where your upper airway muscles relax during sleep and the airway intermittently and repetitively narrows and collapses leading to partially blocked airway (hypopnea) or completely blocked airway (apnea) which, in turn, can disrupt breathing in sleep, lower oxygen levels while you sleep and cause night time wakings. Because both apnea and hypopnea may cause higher carbon dioxide or low oxygen levels, untreated MEET can lead to heart arrhythmia, elevation of blood pressure, and make it harder for the body to consolidate memory and facilitate metabolism (leading to higher blood sugars at night). Frequent partial arousals occur during sleep resulting in sleep deprivation and daytime sleepiness. MEET is associated with an increased risk of cardiovascular disease, stroke, hypertension, and insulin resistance. Moreover, untreated MEET with excessive daytime sleepiness can increase the risk of motor vehicular accidents.    Some conservative strategies for MEET regardless of MEET severity are:   Positional therapy - Avoid sleeping on your back.   Healthy diet and regular  exercise to optimize weight is highly encouraged.   Avoid alcohol late in the evening and sedative-hypnotics as these substances can make sleep apnea worse.   Improve breathing through the nose with intranasal steroid spray, saline rinse, or antihistamines    Safety: Avoid driving vehicle and operating heavy equipment while sleepy. Drowsy driving may lead to life-threatening motor vehicle accidents. A person driving while sleepy is 5 times more likely to have an accident. If you feel sleepy, pull over and take a short power nap (sleep for less than 30 minutes). Otherwise, ask somebody to drive you.    Treatment options for sleep apnea include weight management, positional therapy, Positive Airway Therapy (PAP) therapy, oral appliance therapy, hypoglossal nerve stimulator (Inspire) and select airway surgeries.      OUR SLEEP TESTING LOCATIONS     Our team will contact you to schedule your sleep study, however, you can contact us as follow:  Main Phone Line (scheduling only): [phone]), option 3  Adult and Pediatric Locations  [facility] (6 years and older): [facility] - 4th floor ([address]) After hours line: [phone]  [facility] (Main campus: All ages): [facility], 6th floor. After hours line: [phone]   [place] (18 years and older): [address] [place] [address], 2nd floor   [place] (18 years and older): [address] [facility]; 4th floor  After hours line: [phone]  [facility] (18 years and older) at [place]: [address] [facility] [facility]  After hours line: [phone]    [place] (5 years and older; younger considered on case-by-case basis): [address]; [address], [address]. Scheduling  After hours line: [phone]   [place] (6 years and older): [address]; [address]; [address]   [place] (6 years and older): [address] [facility], [address]  After hours line: [phone]   [place]  "(13 years and older) in [place]: [address], 2nd floor  After hours line: [phone]   [place] (13 year and older): [address]  After hours line: [phone]      IMPORTANT PHONE NUMBERS     Sleep Medicine Clinic Fax: [phone]  Appointments (for Adult Sleep Clinic): [phone] - option 2  Appointments (For Sleep Studies): [phone] - option 3  Behavioral Sleep Medicine: [phone]    [employer] (DME): [phone]  For clinical questions and refilling prescriptions: [phone]  [place] [place] (For [place]): P: [phone]  F: [phone]       CONTACTING YOUR SLEEP MEDICINE PROVIDER     Send a message directly to your provider through \"My Chart\", which is the email service through your  Records Account: [email or web address]   Call [phone] and leave a message. One of the administrative assistants will forward the message to your sleep medicine provider through \"My Chart\" and/or email.     Your sleep medicine provider for this visit was: Krish Dempsey PA-C  "

## 2024-09-04 ENCOUNTER — APPOINTMENT (OUTPATIENT)
Dept: OTOLARYNGOLOGY | Facility: CLINIC | Age: 54
End: 2024-09-04
Payer: COMMERCIAL

## 2024-09-23 ENCOUNTER — OFFICE VISIT (OUTPATIENT)
Dept: ORTHOPEDIC SURGERY | Facility: HOSPITAL | Age: 54
End: 2024-09-23
Payer: COMMERCIAL

## 2024-09-23 ENCOUNTER — HOSPITAL ENCOUNTER (OUTPATIENT)
Dept: RADIOLOGY | Facility: HOSPITAL | Age: 54
Discharge: HOME | End: 2024-09-23
Payer: COMMERCIAL

## 2024-09-23 VITALS — BODY MASS INDEX: 30.48 KG/M2 | WEIGHT: 230 LBS | HEIGHT: 73 IN

## 2024-09-23 DIAGNOSIS — M79.672 FOOT PAIN, BILATERAL: ICD-10-CM

## 2024-09-23 DIAGNOSIS — M79.671 FOOT PAIN, BILATERAL: ICD-10-CM

## 2024-09-23 DIAGNOSIS — M79.671 FOOT PAIN, BILATERAL: Primary | ICD-10-CM

## 2024-09-23 DIAGNOSIS — M72.2 PLANTAR FASCIITIS: ICD-10-CM

## 2024-09-23 DIAGNOSIS — M79.672 FOOT PAIN, BILATERAL: Primary | ICD-10-CM

## 2024-09-23 PROCEDURE — 73630 X-RAY EXAM OF FOOT: CPT | Mod: BILATERAL PROCEDURE | Performed by: RADIOLOGY

## 2024-09-23 PROCEDURE — 99214 OFFICE O/P EST MOD 30 MIN: CPT | Performed by: PHYSICIAN ASSISTANT

## 2024-09-23 PROCEDURE — 3008F BODY MASS INDEX DOCD: CPT | Performed by: PHYSICIAN ASSISTANT

## 2024-09-23 PROCEDURE — 73630 X-RAY EXAM OF FOOT: CPT | Mod: 50

## 2024-09-23 PROCEDURE — 99204 OFFICE O/P NEW MOD 45 MIN: CPT | Performed by: PHYSICIAN ASSISTANT

## 2024-09-23 ASSESSMENT — PAIN SCALES - GENERAL: PAINLEVEL_OUTOF10: 10 - WORST POSSIBLE PAIN

## 2024-09-23 ASSESSMENT — PAIN - FUNCTIONAL ASSESSMENT: PAIN_FUNCTIONAL_ASSESSMENT: 0-10

## 2024-09-23 NOTE — PATIENT INSTRUCTIONS
The stretching program (see handout) should be done for about 10 minutes, three times a day.  You should stretch for 3 times a day for 6 weeks and then daily afterwards to maintain your flexibility.    [ ] If you received a plantar fascial night splint, then it should be worn for 6 weeks nightly and as needed after that period of time.  This is to minimize your stiffness and pain with the first few steps in the morning.    For additional relief, here are some more suggestions.  Lotions such as aspercreme that are available over the counter are helpful to rub in areas of heel pain up to three times daily.  You can also freeze a water bottle and roll the bottom of your foot over it.  Also, a heel cushion or heel cup that is available at local pharmacies or sports stores can be put in your shoe for extra cushioning.     [ ] Finally, if you were given a prescription for orthotics, then once the inserts are made, you can use the orthotics in your shoes to provide support and cushioning.    To help support your foot, you can purchase inserts over the counter. You want to look for full length inserts with a foam arch (not flat gel ones). Brands such as Iain Acosta or others work well. Full length inserts give a little more support than the short ones. But, if you use a full length insert, you often will have to remove the insole that came with the shoe and then put the insert you buy then in the shoe    Follow up as needed

## 2024-09-24 NOTE — PROGRESS NOTES
"NPV-   History of Present Illness  54 y.o.male  1. Foot pain, bilateral  XR foot 3+ views bilateral      2. Plantar fasciitis          Mechanism of injury: none; running, playing kickball  Date of Injury/Pain: August 2024  Location of pain: plantar heel; L>R  Frequency of Pain: worse with walking or standing; start up pain  Associated symptoms? Swelling.  Previous treatment?  rest    27 point review of systems negative except what is stated in HPI    Constitutional Exam: patient's height and weight reviewed, well-kempt  Psychiatric Exam: alert and oriented x 3, appropriate mood and behavior  Eye Exam: HARRISON, EOMI  Pulmonary Exam: breathing non-labored, no apparent distress  Lymphatic exam: no appreciable lymphadenopathy in the lower extremities  Cardiovascular exam: DP pulses 2+ bilaterally, PT 2+ bilaterally, toes are pink with good capillary refill, no pitting edema  Skin exam: no open lesions, rashes, abrasions or ulcerations  Neurological exam: sensation to light touch intact in both lower extremities in peripheral and dermatomal distributions (except for any abnormalities noted in musculoskeletal exam)    On examination of the b/l foot; patient is able to weight bear, no limping gait. pes planus alignment. No swelling, ecchymosis, erythema, lesions, ulcers or atrophy; Normal ROM at 2-5th toe flexion/extension and 1st MTP extension; 5/5 strength with great toe flexion/extension and resisted inversion/eversion); Achilles tightness. Tenderness to palpation over heel and plantar arch. No tenderness to palpation over the base of 1st metatarsal/2nd metatarsal, sesamoids, 5th metatarsal, medial cuneiform, navicular, 1st MTP joint or 2nd or 3rd intermetarsal space.  Neurovascularly intact.  Normal sensation to light touch. normal proprioception. Doralis pedis pulse and posterior tibial pulse 2+ b/l. Good capillary refill. negative foot squeeze test, positive \"too many toes sign\", negative calcaneal squeeze " test    Contralateral foot: normal pes planus alignment     I personally reviewed the patient's x-ray images and reports of the b/l  foot. The xrays show no fractures or dislocation.  Normal joint spacing    ASSESSMENT: b/l foot plantar fasciitis    PLAN: Treatment options were discussed with the patient. The patient can use a plantar night splint to wear to bed for 6 weeks. They can use a frozen water bottle or tennis ball to roll under the arch. They will avoid heels and flats and wear a good supportive shoe.  Patient was given a handout and instructed on an at home stretching program.  They should do these exercises 3 times per day for 6 weeks and then daily. Patient can use OTC aspercream for pain and continue to ice and elevate supported at the calf to reduce swelling. Patient is ambulatory and was given a prescription for orthotics, which are medically necessary due to the patient's medical condition and symptomatic pes planus.  All the patient's questions were answered. The patient agrees with the above plan.  Follow up as needed

## 2024-09-30 ENCOUNTER — OFFICE VISIT (OUTPATIENT)
Dept: URGENT CARE | Age: 54
End: 2024-09-30
Payer: COMMERCIAL

## 2024-09-30 VITALS
BODY MASS INDEX: 30.48 KG/M2 | RESPIRATION RATE: 16 BRPM | HEART RATE: 67 BPM | HEIGHT: 73 IN | SYSTOLIC BLOOD PRESSURE: 141 MMHG | DIASTOLIC BLOOD PRESSURE: 100 MMHG | WEIGHT: 230 LBS | OXYGEN SATURATION: 97 %

## 2024-09-30 DIAGNOSIS — L03.031 CELLULITIS AND ABSCESS OF TOE, RIGHT: Primary | ICD-10-CM

## 2024-09-30 DIAGNOSIS — L02.611 CELLULITIS AND ABSCESS OF TOE, RIGHT: Primary | ICD-10-CM

## 2024-09-30 PROCEDURE — 10060 I&D ABSCESS SIMPLE/SINGLE: CPT

## 2024-09-30 PROCEDURE — 99204 OFFICE O/P NEW MOD 45 MIN: CPT

## 2024-09-30 PROCEDURE — 3008F BODY MASS INDEX DOCD: CPT

## 2024-09-30 RX ORDER — CEPHALEXIN 500 MG/1
500 CAPSULE ORAL 2 TIMES DAILY
Qty: 14 CAPSULE | Refills: 0 | Status: SHIPPED | OUTPATIENT
Start: 2024-09-30 | End: 2024-10-07

## 2024-09-30 ASSESSMENT — ENCOUNTER SYMPTOMS: WOUND: 1

## 2024-09-30 NOTE — PATIENT INSTRUCTIONS
You were seen at Urgent Care today for right great toe pain.  Please treat as discussed. Please take medications as prescribed. Monitor for red flags which we spoke about, If your symptoms change, worsen or become concerning in any way, please go to the emergency room immediately, otherwise you can followup with podiatry as discussed.

## 2024-09-30 NOTE — PROGRESS NOTES
"Subjective   Patient ID: Eddie Young is a 54 y.o. male. They present today with a chief complaint of Toe Pain.    History of Present Illness  Patient is a 54-year-old male with history of hyperlipidemia, coronary artery disease and plantar fasciitis who presents to the urgent care with a complaint of right great toe pain x 2 weeks.  He denies any specific injury or trauma.  He states a couple of days ago, he was able to \"squeeze a little pus out\".  He has been treating his symptoms with over-the-counter medication without significant relief.  No other complaints or concerns mention at this time.      History provided by:  Patient  Toe Pain      Past Medical History  Allergies as of 09/30/2024    (No Known Allergies)       (Not in a hospital admission)         Past Medical History:   Diagnosis Date    Acute upper respiratory infection, unspecified 09/01/2022    URI, acute    CAD (coronary artery disease), native coronary artery     Class 1 obesity with body mass index (BMI) of 30.0 to 30.9 in adult 12/19/2023    30.52 kg/m²    HLD (hyperlipidemia)     Old myocardial infarction 2018    History of myocardial infarction    Wrist arthritis        Past Surgical History:   Procedure Laterality Date    APPENDECTOMY      CORONARY ANGIOPLASTY WITH STENT PLACEMENT  2018    Drug-eluting stent was placedin the circumflex marginal branch    HERNIA REPAIR      WRIST SURGERY Left         reports that he has never smoked. He has never used smokeless tobacco. He reports current alcohol use. He reports that he does not use drugs.    Review of Systems  Review of Systems   Skin:  Positive for wound.                                  Objective    Vitals:    09/30/24 1142   BP: (!) 141/100   Pulse: 67   Resp: 16   SpO2: 97%   Weight: 104 kg (230 lb)   Height: 1.854 m (6' 1\")     No LMP for male patient.    Physical Exam  Vitals and nursing note reviewed.   Constitutional:       General: He is not in acute distress.     Appearance: Normal " appearance. He is not ill-appearing, toxic-appearing or diaphoretic.   HENT:      Head: Normocephalic and atraumatic.      Mouth/Throat:      Mouth: Mucous membranes are moist.   Eyes:      Extraocular Movements: Extraocular movements intact.      Conjunctiva/sclera: Conjunctivae normal.      Pupils: Pupils are equal, round, and reactive to light.   Cardiovascular:      Rate and Rhythm: Normal rate and regular rhythm.      Pulses: Normal pulses.      Heart sounds: Normal heart sounds.   Pulmonary:      Effort: Pulmonary effort is normal. No respiratory distress.      Breath sounds: Normal breath sounds. No stridor. No wheezing, rhonchi or rales.   Chest:      Chest wall: No tenderness.   Musculoskeletal:         General: Normal range of motion.      Cervical back: Normal range of motion and neck supple.        Legs:       Comments: Right great toe with pain, redness and induration along the medial border of the toenail.  Very minimal fluctuance noted.  Toenail appears to be mildly ingrown.  Slightly warm to the touch.  Normal capillary refill.  Pedal pulse strong regular.   Skin:     General: Skin is warm and dry.      Capillary Refill: Capillary refill takes less than 2 seconds.   Neurological:      General: No focal deficit present.      Mental Status: He is alert and oriented to person, place, and time.   Psychiatric:         Mood and Affect: Mood normal.         Behavior: Behavior normal.         Incision and Drainage    Date/Time: 9/30/2024 12:04 PM    Performed by: RICARDO Moreau  Authorized by: RICARDO Moreau    Consent:     Consent obtained:  Verbal    Consent given by:  Patient    Risks, benefits, and alternatives were discussed: yes      Risks discussed:  Bleeding, damage to other organs, infection, incomplete drainage and pain    Alternatives discussed:  No treatment, delayed treatment, alternative treatment, observation and referral  Universal protocol:     Procedure explained and  questions answered to patient or proxy's satisfaction: yes      Relevant documents present and verified: yes      Site/side marked: yes      Immediately prior to procedure, a time out was called: yes      Patient identity confirmed:  Verbally with patient  Location:     Type:  Abscess    Location:  Lower extremity    Lower extremity location:  Toe    Toe location:  R big toe  Pre-procedure details:     Skin preparation:  Povidone-iodine  Sedation:     Sedation type:  None  Anesthesia:     Anesthesia method:  Local infiltration    Local anesthetic:  Lidocaine 2% w/o epi  Procedure type:     Complexity:  Simple  Procedure details:     Ultrasound guidance: no      Needle aspiration: no      Incision types:  Stab incision    Incision depth:  Dermal    Drainage:  Bloody    Drainage amount:  Scant    Wound treatment:  Wound left open    Packing materials:  None  Post-procedure details:     Procedure completion:  Tolerated well, no immediate complications        Assessment/Plan   Allergies, medications, history, and pertinent labs/EKGs/Imaging reviewed by RICARDO Moreau.     Medical Decision Making  Patient is well appearing, afebrile, non toxic, not hypoxic, and appropriate for outpatient treatment and management at time of evaluation. Patient presents with right great toe pain. Differential includes but not limited to: Ingrown toenail, abscess, cellulitis, other.  History and exam consistent with cellulitis and possible abscess.  Given minimal fluctuance, I discussed with the patient the fact that I do not feel I&D would be significantly relieving or beneficial.  He states he was able to squeeze a little bit of pus out a few days ago and would prefer I try to drain the area in hopes of any relief.  He is aware of the risk versus benefits of this.  I&D was performed with scant amount of bloody return only.  See procedure note.  A prescription for Keflex called into his pharmacy use as directed.  A referral to  podiatry was placed on his behalf.      Plan: Discussed differential with the patient. Patient voices understanding and is agreeable to close follow-up with their PCP in the next 2-3 days. They understand they should go to the emergency room immediately for any new, worsening or concerning symptoms. Patient understands return precautions and discharge instructions.      Orders and Diagnoses  Diagnoses and all orders for this visit:  Cellulitis and abscess of toe, right  -     cephalexin (Keflex) 500 mg capsule; Take 1 capsule (500 mg) by mouth 2 times a day for 7 days.      Medical Admin Record      Follow Up Instructions  No follow-ups on file.    Patient disposition: Home    Electronically signed by RICARDO Moreau  12:00 PM

## 2024-10-23 ENCOUNTER — APPOINTMENT (OUTPATIENT)
Dept: PODIATRY | Facility: CLINIC | Age: 54
End: 2024-10-23
Payer: COMMERCIAL

## 2024-10-23 DIAGNOSIS — L03.031 CELLULITIS AND ABSCESS OF TOE, RIGHT: ICD-10-CM

## 2024-10-23 DIAGNOSIS — L02.611 CELLULITIS AND ABSCESS OF TOE, RIGHT: ICD-10-CM

## 2024-10-23 DIAGNOSIS — M72.2 PLANTAR FASCIITIS OF LEFT FOOT: Primary | ICD-10-CM

## 2024-10-23 PROCEDURE — 1036F TOBACCO NON-USER: CPT | Performed by: PODIATRIST

## 2024-10-23 PROCEDURE — 20551 NJX 1 TENDON ORIGIN/INSJ: CPT | Performed by: PODIATRIST

## 2024-10-23 PROCEDURE — 99204 OFFICE O/P NEW MOD 45 MIN: CPT | Performed by: PODIATRIST

## 2024-10-23 RX ORDER — IBUPROFEN 800 MG/1
800 TABLET ORAL 3 TIMES DAILY
Qty: 90 TABLET | Refills: 0 | Status: SHIPPED | OUTPATIENT
Start: 2024-10-23 | End: 2024-11-22

## 2024-10-23 RX ORDER — METHYLPREDNISOLONE 4 MG/1
TABLET ORAL
Qty: 21 TABLET | Refills: 0 | Status: SHIPPED | OUTPATIENT
Start: 2024-10-23 | End: 2024-10-30

## 2024-10-23 RX ADMIN — TRIAMCINOLONE ACETONIDE 80 MG: 40 INJECTION, SUSPENSION INTRA-ARTICULAR; INTRAMUSCULAR at 19:59

## 2024-11-03 RX ORDER — TRIAMCINOLONE ACETONIDE 40 MG/ML
80 INJECTION, SUSPENSION INTRA-ARTICULAR; INTRAMUSCULAR ONCE
Status: COMPLETED | OUTPATIENT
Start: 2024-10-23 | End: 2024-10-23

## 2025-02-12 ENCOUNTER — TELEPHONE (OUTPATIENT)
Dept: CARDIOLOGY | Facility: CLINIC | Age: 55
End: 2025-02-12
Payer: COMMERCIAL

## 2025-02-12 DIAGNOSIS — E78.2 MIXED HYPERLIPIDEMIA: Primary | ICD-10-CM

## 2025-02-12 NOTE — TELEPHONE ENCOUNTER
Pt wife Halina LVM regarding denial of Vascepa. Halina states this is the 2nd call in a few weeks attempting to find out if Dr. Hutchison would like to prescribe a different med or if he is willing to order new labs to provide results for insurance company in hopes they will approve. Please return call at [phone]. Routing to care team for assistance.

## 2025-02-14 ENCOUNTER — TELEPHONE (OUTPATIENT)
Dept: CARDIOLOGY | Facility: HOSPITAL | Age: 55
End: 2025-02-14
Payer: COMMERCIAL

## 2025-02-14 NOTE — TELEPHONE ENCOUNTER
Spoke to patient. We will await lab results. If Vascepa is not working and his labs have not improved he would like to discontinue Vascepa d/t increased GI upset

## 2025-02-14 NOTE — TELEPHONE ENCOUNTER
Patient called office lvm stating he is still having trouble uploading documents and asks to speak with someone to advise him further. Called number provided in  [phone] and spoke with patient who explained that the denial letter he received for his icosapent ethyL (Vascepa) 1 gram capsule [[id number]] was denied because he did not have current up to date labs. Patient stated he will be coming into office Mon 2/17 to drop off denial paper for us to scan into his chart.

## 2025-02-14 NOTE — TELEPHONE ENCOUNTER
Tried to call patient and souse at this time to help with a letter via [employer] and was unsuccessful.     VM phone number was out of service and phone number in chart was for someone else.    Sent patient message via my chart to call us back.    Thank you!  Gabino SHARP

## 2025-02-15 LAB
ALBUMIN SERPL-MCNC: 4.9 G/DL (ref 3.6–5.1)
ALP SERPL-CCNC: 76 U/L (ref 35–144)
ALT SERPL-CCNC: 32 U/L (ref 9–46)
ANION GAP SERPL CALCULATED.4IONS-SCNC: 10 MMOL/L (CALC) (ref 7–17)
AST SERPL-CCNC: 19 U/L (ref 10–35)
BILIRUB SERPL-MCNC: 1 MG/DL (ref 0.2–1.2)
BUN SERPL-MCNC: 16 MG/DL (ref 7–25)
CALCIUM SERPL-MCNC: 9.3 MG/DL (ref 8.6–10.3)
CHLORIDE SERPL-SCNC: 108 MMOL/L (ref 98–110)
CHOLEST SERPL-MCNC: 121 MG/DL
CHOLEST/HDLC SERPL: 3 (CALC)
CO2 SERPL-SCNC: 23 MMOL/L (ref 20–32)
CREAT SERPL-MCNC: 0.89 MG/DL (ref 0.7–1.3)
EGFRCR SERPLBLD CKD-EPI 2021: 102 ML/MIN/1.73M2
GLUCOSE SERPL-MCNC: 87 MG/DL (ref 65–99)
HDLC SERPL-MCNC: 41 MG/DL
LDLC SERPL CALC-MCNC: 55 MG/DL (CALC)
NONHDLC SERPL-MCNC: 80 MG/DL (CALC)
POTASSIUM SERPL-SCNC: 4.2 MMOL/L (ref 3.5–5.3)
PROT SERPL-MCNC: 6.8 G/DL (ref 6.1–8.1)
SODIUM SERPL-SCNC: 141 MMOL/L (ref 135–146)
TRIGL SERPL-MCNC: 173 MG/DL

## 2025-02-15 NOTE — TELEPHONE ENCOUNTER
Lipid results, seems there is improvement while on Vascepa. Triglycerides are now 173, previously 376 in 7/2024 (pt doesn't believe he fasted in July) However, patient does not like the taste of Vascepa (Fishy) and is having GI upset.

## 2025-02-20 DIAGNOSIS — E78.2 MIXED HYPERLIPIDEMIA: Primary | ICD-10-CM

## 2025-02-20 NOTE — TELEPHONE ENCOUNTER
Patient called in to check the status of the Lovaza that was being sent over to the pharmacy, has not heard anything yet, just wanted to clarify.

## 2025-02-21 RX ORDER — OMEGA-3-ACID ETHYL ESTERS 1 G/1
2 CAPSULE, LIQUID FILLED ORAL 2 TIMES DAILY
Qty: 360 CAPSULE | Refills: 3 | Status: SHIPPED | OUTPATIENT
Start: 2025-02-21 | End: 2026-02-21

## 2025-02-21 NOTE — TELEPHONE ENCOUNTER
Called patient at this time to update him that his Rx was sent and to confirm jis annual appt.     While on the phone patient asked if he needed labs before his next appointment.    Per Sary RN he doesn't     Patient expressed understanding    Thank you!  Gabino SHARP

## 2025-04-23 ENCOUNTER — APPOINTMENT (OUTPATIENT)
Dept: PODIATRY | Facility: CLINIC | Age: 55
End: 2025-04-23
Payer: COMMERCIAL

## 2025-04-23 DIAGNOSIS — M79.672 LEFT FOOT PAIN: ICD-10-CM

## 2025-04-23 DIAGNOSIS — M72.2 PLANTAR FASCIITIS OF LEFT FOOT: Primary | ICD-10-CM

## 2025-04-23 DIAGNOSIS — M79.672 INTRACTABLE LEFT HEEL PAIN: ICD-10-CM

## 2025-04-23 PROCEDURE — 1036F TOBACCO NON-USER: CPT | Performed by: PODIATRIST

## 2025-04-23 PROCEDURE — 20551 NJX 1 TENDON ORIGIN/INSJ: CPT | Performed by: PODIATRIST

## 2025-04-23 PROCEDURE — 99213 OFFICE O/P EST LOW 20 MIN: CPT | Performed by: PODIATRIST

## 2025-04-23 RX ORDER — TRIAMCINOLONE ACETONIDE 40 MG/ML
80 INJECTION, SUSPENSION INTRA-ARTICULAR; INTRAMUSCULAR ONCE
Status: COMPLETED | OUTPATIENT
Start: 2025-04-23 | End: 2025-04-23

## 2025-04-23 RX ADMIN — TRIAMCINOLONE ACETONIDE 80 MG: 40 INJECTION, SUSPENSION INTRA-ARTICULAR; INTRAMUSCULAR at 14:25

## 2025-04-23 NOTE — PROGRESS NOTES
History of Present Illness:   Patient states they are here for heel pain  States pain is most bothersome  Hurts most in morning  Denies trauma  No improvement noted  Looking for relief  Denies being DM  NO other pedal complaints at this time    Also has concerns of IGTN. States they continue to dig into nail. Would like to discuss perm vs temporary options    Left heel pain continues , would like to try another injection    Past Medical History  Past Medical History:   Diagnosis Date    Acute upper respiratory infection, unspecified 09/01/2022    URI, acute    CAD (coronary artery disease), native coronary artery     Class 1 obesity with body mass index (BMI) of 30.0 to 30.9 in adult 12/19/2023    30.52 kg/m²    HLD (hyperlipidemia)     Old myocardial infarction 2018    History of myocardial infarction    Wrist arthritis        Medications and Allergies have been reviewed.    Review Of Systems:  GENERAL: No weight loss, malaise or fevers.  HEENT: Negative for frequent or significant headaches,   RESPIRATORY: Negative for cough, wheezing or shortness of breath.  CARDIOVASCULAR: Negative for chest pain, leg swelling or palpitations.      Examination of Both Lower Extremities:   Objective:   Vasc: DP and PT pulses are palpable bilateral.  CFT is less than 3 seconds bilateral.  Skin temperature is warm to cool proximal to distal bilateral.      Neuro: Vibratory, light touch and proprioception are intact bilateral.    Derm: Nails 1-5 bilateral are intact. No Soi noted.  Skin is supple with normal texture and turgor noted.  Webspaces are clean, dry and intact bilateral.  There are no hyperkeratoses, ulcerations, verruca or other lesions noted.      Ortho: Muscle strength is 5/5 for all pedal groups tested.  Pain to left heel. Pain to med calc tubercle No edema, erythema or ecchymosis noted.   1. Plantar fasciitis of left foot  [employer]    triamcinolone acetonide (Kenalog-40) injection 80 mg      2. Left foot pain   [employer]    triamcinolone acetonide (Kenalog-40) injection 80 mg      3. Intractable left heel pain              Patient exam and eval  Discussed Plantar fasciitis/heel spur of the foot.  Discussed causes, symptoms, aggravating factors and treatment options  Recommend stiff supportive shoe gear  Shoes that do not twist or bend  Discussed injection to plantar fascia to help decrease pain - tolerated left heel well  Discussed ice, nsaids, dorothea snowden/biofreeze  Rx ibu  Patient to follow up if no improvement noted.   Pt in agreement to plan  All questions answered  BL hallux debridement. No need for oral abx  Discussed perm and temporary nail avulsion  Fu if no noted improvement        Sandra Ferguson DPM  [phone]  [facility]  Fax: [phone]

## 2025-07-16 NOTE — PROGRESS NOTES
"Counseling:  The patient was counseled regarding diagnostic results, instructions for management, risk factor reductions, prognosis, patient and family education, impressions, risks and benefits of treatment options and importance of compliance with treatment.      Chief Complaint:  The patient presents today for annual followup of CAD and hyperlipidemia.     History Of Present Illness:    Eddie Young is a 55 y.o. male patient who presents today for annual followup of CAD and hyperlipidemia. His PMH is significant for CAD with h/o NSTEMI s/p PCI of proximal to mid second obtuse marginal 01/29/2018, hyperlipidemia, SCC of skin, sleep apnea (intolerant of CPAP), and h/o alcohol abuse. Over the past year, the patient states that he has done well from a cardiac standpoint. He denies any CP, chest discomfort or pressure. He reports an occasional chest \"twinge.\" He denies any SOB. BP has been stable. EKG today shows NSR with no acute changes. The patient is compliant with his prescribed medications.      Past Surgical History:  He has a past surgical history that includes Coronary angioplasty with stent (2018); Appendectomy; Hernia repair; and Wrist surgery (Left).      Social History:  He reports that he has never smoked. He has never used smokeless tobacco. He reports current alcohol use. He reports that he does not use drugs.    Family History:  No family history on file.     Allergies:  Patient has no known allergies.    Outpatient Medications:  Current Outpatient Medications   Medication Instructions    aspirin 81 mg chewable tablet 1 tablet, oral, Daily    atorvastatin (Lipitor) 80 mg tablet 1 tablet, oral, Daily    cholecalciferol (VITAMIN D-3) 1,000 Units, oral, Daily RT    cholestyramine (Questran) 4 gram packet 4 g, oral, 3 times daily (morning, midday, late afternoon)    glucosamine HCl 1,500 mg tablet 1 tablet, oral, Daily    multivitamin tablet 1 tablet, oral, Daily RT    omega-3 acid ethyl esters (LOVAZA) 2 " "g, oral, 2 times daily        Last Recorded Vitals:  There were no vitals filed for this visit.      Review of Systems   Cardiovascular:         Occasional chest \"twinge\"    All other systems reviewed and are negative.     Physical Exam:  Constitutional:       Appearance: Healthy appearance. Not in distress.   Neck:      Vascular: No JVR. JVD normal.   Pulmonary:      Effort: Pulmonary effort is normal.      Breath sounds: Normal breath sounds. No wheezing. No rhonchi. No rales.   Chest:      Chest wall: Not tender to palpatation.   Cardiovascular:      PMI at left midclavicular line. Normal rate. Regular rhythm. Normal S1. Normal S2.       Murmurs: There is no murmur.      No gallop.  No click. No rub.   Pulses:     Intact distal pulses.   Edema:     Peripheral edema absent.   Abdominal:      General: Bowel sounds are normal.      Palpations: Abdomen is soft.      Tenderness: There is no abdominal tenderness.   Musculoskeletal: Normal range of motion.         General: No tenderness. Skin:     General: Skin is warm and dry.   Neurological:      General: No focal deficit present.      Mental Status: Alert and oriented to person, place and time.          Last Labs:  CBC -  Lab Results   Component Value Date    WBC 8.6 07/15/2024    HGB 16.6 07/15/2024    HCT 47.5 07/15/2024    MCV 87 07/15/2024     07/15/2024       CMP -  Lab Results   Component Value Date    CALCIUM 9.3 02/14/2025    PROT 6.8 02/14/2025    ALBUMIN 4.9 02/14/2025    AST 19 02/14/2025    ALT 32 02/14/2025    ALKPHOS 76 02/14/2025    BILITOT 1.0 02/14/2025       LIPID PANEL -   Lab Results   Component Value Date    CHOL 121 02/14/2025    TRIG 173 (H) 02/14/2025    HDL 41 02/14/2025    CHHDL 3.0 02/14/2025    LDLF 62 08/08/2022    VLDL 75 (H) 07/15/2024    NHDL 80 02/14/2025       RENAL FUNCTION PANEL -   Lab Results   Component Value Date    GLUCOSE 87 02/14/2025     02/14/2025    K 4.2 02/14/2025     02/14/2025    CO2 23 02/14/2025 " "   ANIONGAP 10 02/14/2025    BUN 16 02/14/2025    CREATININE 0.89 02/14/2025    GFRMALE >90 08/08/2022    CALCIUM 9.3 02/14/2025    ALBUMIN 4.9 02/14/2025          Last Cardiology Tests:  11/18/2021 - Stress Echo  1. Procedure narrative: Treadmill exercise testing was performed using the Kemar protocol. The patient exercised for 10 min 52 sec, to a maximal work rate of 13.1 mets. Exercise was terminated due to moderate dyspnea and moderate fatigue.   2. Left ventricle: Systolic function is normal by the biplane method of disks. The estimated ejection fraction is 64%.   3. Stress: Stress testing did not produce any symptoms suggestive of coronary artery disease. Functional capacity is above average.   4. Stress ECG conclusions: The stress ECG is normal. [facility] scoring: exercise time of 11 min; maximum ST deviation of 0 mm; no angina; resulting score is 11.0. This score predicts a low risk of cardiac events.   5. Stress echo: There is no evidence for stress-induced ischemia.   6. Normal study after maximal exercise.     02/07/2020 - Vascular Lab Carotid Artery Duplex    Minimal bilateral carotid plaque; no stenosis greater than 50%.    01/29/2018 - Cardiac Catheterization ([facility])  1. Severe single vessel coronary artery disease.  2. Successful angioplasty and successful (drug-eluting) stent of the 99% stenosis in the proximal to mid second obtuse marginal artery.    Lab review: I have personally reviewed the laboratory result(s) from 02/14/2025.     Assessment/Plan   1) CAD with h/o NSTEMI s/p PCI of Proximal to Mid Second Obtuse Marginal 01/29/2018  On ASA 81 mg daily, atorvastatin 80 mg daily  Previously followed by cardiology at [place] - no longer in insurance network   Stress echo 11/18/2021 negative for ischemia  Denies CP, chest discomfort or pressure  Reports occasional chest \"twinge\"   Denies SOB  BP stable  EKG stable   Continue current medical Rx   Check exercise stress echo  F/U after testing     2) " Hyperlipidemia  On atorvastatin 80 mg daily   Lipid panel 02/14/2025 with total cholesterol, LDL and triglycerides of 121, 55 and 173 respectively   Continue current medical Rx     3) MEET  Has a diagnosis of moderate MEET  Is currently trying to obtain a CPAP machine  Snoring has improved since decreasing alcohol intake     4) Low Testosterone   Referral placed to Dr. Lebron Ocasio Attestation  By signing my name below, I, Amarjit Camacho, attest that this documentation has been prepared under the direction and in the presence of Arthur Hutchison MD.

## 2025-07-17 ENCOUNTER — APPOINTMENT (OUTPATIENT)
Dept: CARDIOLOGY | Facility: CLINIC | Age: 55
End: 2025-07-17
Payer: COMMERCIAL

## 2025-07-17 ENCOUNTER — TELEPHONE (OUTPATIENT)
Dept: CARDIOLOGY | Facility: HOSPITAL | Age: 55
End: 2025-07-17

## 2025-07-17 VITALS
BODY MASS INDEX: 29.82 KG/M2 | OXYGEN SATURATION: 96 % | HEART RATE: 75 BPM | DIASTOLIC BLOOD PRESSURE: 80 MMHG | SYSTOLIC BLOOD PRESSURE: 120 MMHG | WEIGHT: 225 LBS | HEIGHT: 73 IN

## 2025-07-17 DIAGNOSIS — I25.10 ARTERIOSCLEROSIS OF CORONARY ARTERY: Primary | ICD-10-CM

## 2025-07-17 DIAGNOSIS — E78.2 MIXED HYPERLIPIDEMIA: ICD-10-CM

## 2025-07-17 DIAGNOSIS — I20.89 OTHER FORMS OF ANGINA PECTORIS: ICD-10-CM

## 2025-07-17 DIAGNOSIS — K58.8 OTHER IRRITABLE BOWEL SYNDROME: ICD-10-CM

## 2025-07-17 DIAGNOSIS — R79.89 LOW TESTOSTERONE: ICD-10-CM

## 2025-07-17 LAB
ATRIAL RATE: 75 BPM
P AXIS: 56 DEGREES
P OFFSET: 203 MS
P ONSET: 151 MS
PR INTERVAL: 144 MS
Q ONSET: 223 MS
QRS COUNT: 13 BEATS
QRS DURATION: 80 MS
QT INTERVAL: 348 MS
QTC CALCULATION(BAZETT): 388 MS
QTC FREDERICIA: 374 MS
R AXIS: 65 DEGREES
T AXIS: 51 DEGREES
T OFFSET: 397 MS
VENTRICULAR RATE: 75 BPM

## 2025-07-17 PROCEDURE — 99203 OFFICE O/P NEW LOW 30 MIN: CPT

## 2025-07-17 PROCEDURE — 93005 ELECTROCARDIOGRAM TRACING: CPT | Performed by: INTERNAL MEDICINE

## 2025-07-17 PROCEDURE — 99214 OFFICE O/P EST MOD 30 MIN: CPT | Performed by: INTERNAL MEDICINE

## 2025-07-17 PROCEDURE — 1036F TOBACCO NON-USER: CPT | Performed by: INTERNAL MEDICINE

## 2025-07-17 PROCEDURE — 3008F BODY MASS INDEX DOCD: CPT | Performed by: INTERNAL MEDICINE

## 2025-07-17 NOTE — PATIENT INSTRUCTIONS
Continue all current medications as prescribed.  Dr. Hutchison has ordered a stress test to ensure your heart is getting adequate blood flow and there is no evidence of any blockages within the heart arteries.    Dr. Hutchison has placed a referral to Dr. Diana, urologist, for further evaluation of low testosterone.   Followup with Dr. Hutchison after the above test.    If you have any questions or cardiac concerns, please call our office at [phone].

## 2025-07-17 NOTE — TELEPHONE ENCOUNTER
----- Message from Nurse Amebr BOBBY sent at 7/17/2025  1:00 PM EDT -----  Regarding: Refill  Refill atorvastatin. Patient phone [phone]

## 2025-07-18 RX ORDER — ATORVASTATIN CALCIUM 80 MG/1
80 TABLET, FILM COATED ORAL DAILY
Qty: 90 TABLET | Refills: 3 | Status: SHIPPED | OUTPATIENT
Start: 2025-07-18 | End: 2026-07-18

## 2025-08-04 ENCOUNTER — APPOINTMENT (OUTPATIENT)
Dept: CARDIOLOGY | Facility: HOSPITAL | Age: 55
End: 2025-08-04
Payer: COMMERCIAL

## 2025-08-07 ENCOUNTER — APPOINTMENT (OUTPATIENT)
Dept: CARDIOLOGY | Facility: HOSPITAL | Age: 55
End: 2025-08-07
Payer: COMMERCIAL

## 2025-08-18 ENCOUNTER — HOSPITAL ENCOUNTER (OUTPATIENT)
Dept: CARDIOLOGY | Facility: HOSPITAL | Age: 55
Discharge: HOME | End: 2025-08-18
Payer: COMMERCIAL

## 2025-08-18 DIAGNOSIS — I20.89 OTHER FORMS OF ANGINA PECTORIS: ICD-10-CM

## 2025-08-18 DIAGNOSIS — I25.10 ARTERIOSCLEROSIS OF CORONARY ARTERY: ICD-10-CM

## 2025-08-18 PROCEDURE — 93017 CV STRESS TEST TRACING ONLY: CPT

## 2025-08-18 PROCEDURE — 93018 CV STRESS TEST I&R ONLY: CPT | Performed by: INTERNAL MEDICINE

## 2025-08-18 PROCEDURE — 93016 CV STRESS TEST SUPVJ ONLY: CPT | Performed by: INTERNAL MEDICINE

## 2025-08-18 PROCEDURE — 2500000004 HC RX 250 GENERAL PHARMACY W/ HCPCS (ALT 636 FOR OP/ED): Mod: JW | Performed by: INTERNAL MEDICINE

## 2025-08-18 PROCEDURE — 93350 STRESS TTE ONLY: CPT | Performed by: INTERNAL MEDICINE

## 2025-08-18 RX ADMIN — PERFLUTREN 2 ML OF DILUTION: 6.52 INJECTION, SUSPENSION INTRAVENOUS at 11:10

## 2025-08-21 ENCOUNTER — APPOINTMENT (OUTPATIENT)
Dept: UROLOGY | Facility: CLINIC | Age: 55
End: 2025-08-21
Payer: COMMERCIAL

## 2025-08-21 DIAGNOSIS — R39.9 URINARY SYMPTOM OR SIGN: ICD-10-CM

## 2025-08-21 DIAGNOSIS — Z12.5 ENCOUNTER FOR SCREENING PROSTATE SPECIFIC ANTIGEN (PSA) MEASUREMENT: Primary | ICD-10-CM

## 2025-08-21 DIAGNOSIS — E29.1 HYPOGONADISM IN MALE: ICD-10-CM

## 2025-08-21 DIAGNOSIS — N40.1 BENIGN PROSTATIC HYPERPLASIA WITH LOWER URINARY TRACT SYMPTOMS, SYMPTOM DETAILS UNSPECIFIED: ICD-10-CM

## 2025-08-21 LAB
POC APPEARANCE, URINE: CLEAR
POC BILIRUBIN, URINE: NEGATIVE
POC BLOOD, URINE: NEGATIVE
POC COLOR, URINE: YELLOW
POC GLUCOSE, URINE: NEGATIVE MG/DL
POC KETONES, URINE: NEGATIVE MG/DL
POC LEUKOCYTES, URINE: NEGATIVE
POC NITRITE,URINE: NEGATIVE
POC PH, URINE: 6.5 PH
POC PROTEIN, URINE: NEGATIVE MG/DL
POC SPECIFIC GRAVITY, URINE: <=1.005
POC UROBILINOGEN, URINE: 0.2 EU/DL

## 2025-08-21 PROCEDURE — 81003 URINALYSIS AUTO W/O SCOPE: CPT | Performed by: UROLOGY

## 2025-08-21 PROCEDURE — 1036F TOBACCO NON-USER: CPT | Performed by: UROLOGY

## 2025-08-21 PROCEDURE — 99204 OFFICE O/P NEW MOD 45 MIN: CPT | Performed by: UROLOGY

## 2025-08-26 ENCOUNTER — OFFICE VISIT (OUTPATIENT)
Dept: CARDIOLOGY | Facility: HOSPITAL | Age: 55
End: 2025-08-26
Payer: COMMERCIAL

## 2025-08-26 VITALS
BODY MASS INDEX: 30.43 KG/M2 | OXYGEN SATURATION: 98 % | HEIGHT: 73 IN | HEART RATE: 72 BPM | DIASTOLIC BLOOD PRESSURE: 80 MMHG | SYSTOLIC BLOOD PRESSURE: 140 MMHG | WEIGHT: 229.6 LBS

## 2025-08-26 DIAGNOSIS — I25.10 ARTERIOSCLEROSIS OF CORONARY ARTERY: Primary | ICD-10-CM

## 2025-08-26 PROCEDURE — 3008F BODY MASS INDEX DOCD: CPT | Performed by: INTERNAL MEDICINE

## 2025-08-26 PROCEDURE — 1036F TOBACCO NON-USER: CPT | Performed by: INTERNAL MEDICINE

## 2025-08-26 PROCEDURE — 99212 OFFICE O/P EST SF 10 MIN: CPT

## 2025-08-26 PROCEDURE — 99213 OFFICE O/P EST LOW 20 MIN: CPT | Performed by: INTERNAL MEDICINE

## 2025-08-29 LAB
PSA SERPL-MCNC: 0.82 NG/ML
TESTOST FREE SERPL-MCNC: 54.5 PG/ML (ref 35–155)
TESTOST SERPL-MCNC: 306 NG/DL (ref 250–1100)

## 2025-09-02 ENCOUNTER — APPOINTMENT (OUTPATIENT)
Dept: UROLOGY | Facility: HOSPITAL | Age: 55
End: 2025-09-02
Payer: COMMERCIAL

## 2025-09-15 ENCOUNTER — APPOINTMENT (OUTPATIENT)
Dept: UROLOGY | Facility: CLINIC | Age: 55
End: 2025-09-15
Payer: COMMERCIAL

## 2025-09-17 ENCOUNTER — APPOINTMENT (OUTPATIENT)
Dept: PODIATRY | Facility: CLINIC | Age: 55
End: 2025-09-17
Payer: COMMERCIAL

## 2026-07-17 ENCOUNTER — APPOINTMENT (OUTPATIENT)
Dept: CARDIOLOGY | Facility: HOSPITAL | Age: 56
End: 2026-07-17
Payer: COMMERCIAL

## (undated) DEVICE — SLING, ARM, LARGE

## (undated) DEVICE — CUFF, TOURNIQUET, 18 X 4, SNGL PORT/SNGL BLADDER, DISP, LF

## (undated) DEVICE — SUTURE, V-LOC, 4-0, 23IN, P-12, 90 ABS

## (undated) DEVICE — PADDING, WEBRIL, UNDERCAST, STERILE, 3 IN

## (undated) DEVICE — COVER, MINI DRAPE SET, C-ARM, FOR OEC/GE

## (undated) DEVICE — Device

## (undated) DEVICE — SUTURE, VICRYL PLUS, 2-0, 27 IN, CT-2, UNDYED

## (undated) DEVICE — SUTURE, MONOCRYL, 4-0, 18 IN, PS2, UNDYED

## (undated) DEVICE — BANDAGE, ELASTIC, MATRIX, SELF-CLOSURE, 3 IN X 5 YD, LF

## (undated) DEVICE — SKIN CLOSURE SYS, PREMIERPRO EXOFIN, 1-4CM X 22CM, 1.75G TUBE

## (undated) DEVICE — SOLUTION, IRRIGATION, SODIUM CHLORIDE 0.9%, 1000 ML, POUR BOTTLE

## (undated) DEVICE — PREP TRAY, SKIN, DRY, W/GLOVES

## (undated) DEVICE — HOLSTER, BOVIE, ES PENCIL, DISP